# Patient Record
Sex: MALE | Race: BLACK OR AFRICAN AMERICAN | Employment: FULL TIME | URBAN - METROPOLITAN AREA
[De-identification: names, ages, dates, MRNs, and addresses within clinical notes are randomized per-mention and may not be internally consistent; named-entity substitution may affect disease eponyms.]

---

## 2018-12-23 ENCOUNTER — HOSPITAL ENCOUNTER (EMERGENCY)
Age: 53
Discharge: HOME | End: 2018-12-24
Attending: EMERGENCY MEDICINE
Payer: COMMERCIAL

## 2018-12-23 ENCOUNTER — APPOINTMENT (OUTPATIENT)
Dept: GENERAL RADIOLOGY | Age: 53
End: 2018-12-23
Payer: COMMERCIAL

## 2018-12-23 DIAGNOSIS — J02.0 STREP PHARYNGITIS: Primary | ICD-10-CM

## 2018-12-23 LAB
CHP ED QC CHECK: YES
GLUCOSE BLD-MCNC: 210 MG/DL
METER GLUCOSE: 210 MG/DL (ref 74–99)
STREP GRP A PCR: POSITIVE

## 2018-12-23 PROCEDURE — 71046 X-RAY EXAM CHEST 2 VIEWS: CPT

## 2018-12-23 PROCEDURE — 82962 GLUCOSE BLOOD TEST: CPT

## 2018-12-23 PROCEDURE — 87880 STREP A ASSAY W/OPTIC: CPT

## 2018-12-23 PROCEDURE — 99283 EMERGENCY DEPT VISIT LOW MDM: CPT

## 2018-12-23 ASSESSMENT — ENCOUNTER SYMPTOMS
ABDOMINAL DISTENTION: 0
NAUSEA: 0
RHINORRHEA: 1
SORE THROAT: 1
BACK PAIN: 0
ABDOMINAL PAIN: 0
CHEST TIGHTNESS: 0
WHEEZING: 0
COUGH: 1
SHORTNESS OF BREATH: 0
TROUBLE SWALLOWING: 0
DIARRHEA: 0
VOICE CHANGE: 0
SINUS PRESSURE: 1
VOMITING: 0

## 2018-12-23 ASSESSMENT — PAIN DESCRIPTION - LOCATION: LOCATION: BACK;SHOULDER

## 2018-12-23 ASSESSMENT — PAIN DESCRIPTION - FREQUENCY: FREQUENCY: CONTINUOUS

## 2018-12-23 ASSESSMENT — PAIN DESCRIPTION - PAIN TYPE: TYPE: ACUTE PAIN

## 2018-12-23 ASSESSMENT — PAIN DESCRIPTION - DESCRIPTORS: DESCRIPTORS: ACHING

## 2018-12-24 VITALS
RESPIRATION RATE: 16 BRPM | OXYGEN SATURATION: 93 % | TEMPERATURE: 98.6 F | HEART RATE: 112 BPM | HEIGHT: 69 IN | DIASTOLIC BLOOD PRESSURE: 114 MMHG | BODY MASS INDEX: 35.99 KG/M2 | SYSTOLIC BLOOD PRESSURE: 167 MMHG | WEIGHT: 243 LBS

## 2018-12-24 PROCEDURE — 6370000000 HC RX 637 (ALT 250 FOR IP): Performed by: EMERGENCY MEDICINE

## 2018-12-24 RX ORDER — AMOXICILLIN 250 MG/1
500 CAPSULE ORAL ONCE
Status: COMPLETED | OUTPATIENT
Start: 2018-12-24 | End: 2018-12-24

## 2018-12-24 RX ORDER — AMOXICILLIN 500 MG/1
500 CAPSULE ORAL 2 TIMES DAILY
Qty: 20 CAPSULE | Refills: 0 | Status: SHIPPED | OUTPATIENT
Start: 2018-12-24 | End: 2019-01-03

## 2018-12-24 RX ADMIN — AMOXICILLIN 500 MG: 250 CAPSULE ORAL at 00:39

## 2018-12-24 NOTE — ED PROVIDER NOTES
midline. No trismus in the jaw. No uvula swelling. Posterior oropharyngeal erythema present. No oropharyngeal exudate, posterior oropharyngeal edema or tonsillar abscesses. Nasal passage bogginess and drainage noted. Gen. frontal erythema noted with postnasal drainage. No uvular deviation, no focal tonsillar hypertrophy or swelling or exudate. No signs of abscess. No trismus or stridor. Eyes: Pupils are equal, round, and reactive to light. Neck: Normal range of motion and full passive range of motion without pain. Neck supple. No tracheal tenderness, no spinous process tenderness and no muscular tenderness present. No neck rigidity. No tracheal deviation, no edema, no erythema and normal range of motion present. Cardiovascular: Normal rate, regular rhythm and normal heart sounds. No murmur heard. Pulmonary/Chest: Effort normal and breath sounds normal. No stridor. No respiratory distress. He has no decreased breath sounds. He has no wheezes. He has no rhonchi. He has no rales. He exhibits no tenderness. Abdominal: Soft. Normal appearance and bowel sounds are normal. There is no tenderness. There is no rigidity, no rebound, no guarding and no CVA tenderness. Musculoskeletal: He exhibits no edema, tenderness or deformity. There is no pretibial edema nor calf tenderness bilaterally       Neurological: He is alert and oriented to person, place, and time. He is not disoriented. He displays no atrophy and no tremor. No cranial nerve deficit or sensory deficit. He exhibits normal muscle tone. He displays no seizure activity. Coordination and gait normal. GCS eye subscore is 4. GCS verbal subscore is 5. GCS motor subscore is 6. Skin: Skin is warm and dry. He is not diaphoretic. Nursing note and vitals reviewed.       Procedures    MDM            --------------------------------------------- PAST HISTORY ---------------------------------------------  Past Medical History:  has a past medical history of

## 2019-02-21 ENCOUNTER — APPOINTMENT (OUTPATIENT)
Dept: CT IMAGING | Age: 54
End: 2019-02-21
Payer: COMMERCIAL

## 2019-02-21 ENCOUNTER — HOSPITAL ENCOUNTER (EMERGENCY)
Age: 54
Discharge: HOME | End: 2019-02-21
Attending: EMERGENCY MEDICINE
Payer: COMMERCIAL

## 2019-02-21 VITALS
SYSTOLIC BLOOD PRESSURE: 183 MMHG | TEMPERATURE: 98.4 F | WEIGHT: 246.31 LBS | BODY MASS INDEX: 36.48 KG/M2 | HEART RATE: 82 BPM | RESPIRATION RATE: 16 BRPM | DIASTOLIC BLOOD PRESSURE: 108 MMHG | OXYGEN SATURATION: 98 % | HEIGHT: 69 IN

## 2019-02-21 DIAGNOSIS — K76.89 LIVER CYST: ICD-10-CM

## 2019-02-21 DIAGNOSIS — R10.12 ABDOMINAL PAIN, LEFT UPPER QUADRANT: Primary | ICD-10-CM

## 2019-02-21 LAB
ALBUMIN SERPL-MCNC: 4.3 G/DL (ref 3.5–5.2)
ALP BLD-CCNC: 56 U/L (ref 40–129)
ALT SERPL-CCNC: 31 U/L (ref 0–40)
ANION GAP SERPL CALCULATED.3IONS-SCNC: 13 MMOL/L (ref 7–16)
AST SERPL-CCNC: 30 U/L (ref 0–39)
BACTERIA: NORMAL /HPF
BILIRUB SERPL-MCNC: 0.3 MG/DL (ref 0–1.2)
BILIRUBIN URINE: NEGATIVE
BLOOD, URINE: ABNORMAL
BUN BLDV-MCNC: 17 MG/DL (ref 6–20)
CALCIUM SERPL-MCNC: 9 MG/DL (ref 8.6–10.2)
CHLORIDE BLD-SCNC: 100 MMOL/L (ref 98–107)
CLARITY: CLEAR
CO2: 24 MMOL/L (ref 22–29)
COLOR: YELLOW
CREAT SERPL-MCNC: 1.3 MG/DL (ref 0.7–1.2)
EPITHELIAL CELLS, UA: NORMAL /HPF
GFR AFRICAN AMERICAN: >60
GFR NON-AFRICAN AMERICAN: >60 ML/MIN/1.73
GLUCOSE BLD-MCNC: 154 MG/DL (ref 74–99)
GLUCOSE URINE: NEGATIVE MG/DL
HCT VFR BLD CALC: 46.5 % (ref 37–54)
HEMOGLOBIN: 15.7 G/DL (ref 12.5–16.5)
KETONES, URINE: NEGATIVE MG/DL
LACTIC ACID: 0.9 MMOL/L (ref 0.5–2.2)
LEUKOCYTE ESTERASE, URINE: NEGATIVE
LIPASE: 21 U/L (ref 13–60)
MCH RBC QN AUTO: 31.2 PG (ref 26–35)
MCHC RBC AUTO-ENTMCNC: 33.8 % (ref 32–34.5)
MCV RBC AUTO: 92.4 FL (ref 80–99.9)
NITRITE, URINE: NEGATIVE
PDW BLD-RTO: 12.2 FL (ref 11.5–15)
PH UA: 5.5 (ref 5–9)
PLATELET # BLD: 297 E9/L (ref 130–450)
PMV BLD AUTO: 11.3 FL (ref 7–12)
POTASSIUM SERPL-SCNC: 4.4 MMOL/L (ref 3.5–5)
PROTEIN UA: 30 MG/DL
RBC # BLD: 5.03 E12/L (ref 3.8–5.8)
RBC UA: NORMAL /HPF (ref 0–2)
SODIUM BLD-SCNC: 137 MMOL/L (ref 132–146)
SPECIFIC GRAVITY UA: 1.02 (ref 1–1.03)
TOTAL PROTEIN: 8.5 G/DL (ref 6.4–8.3)
UROBILINOGEN, URINE: 0.2 E.U./DL
WBC # BLD: 13.3 E9/L (ref 4.5–11.5)
WBC UA: NORMAL /HPF (ref 0–5)

## 2019-02-21 PROCEDURE — 99284 EMERGENCY DEPT VISIT MOD MDM: CPT

## 2019-02-21 PROCEDURE — 83605 ASSAY OF LACTIC ACID: CPT

## 2019-02-21 PROCEDURE — 6360000002 HC RX W HCPCS: Performed by: EMERGENCY MEDICINE

## 2019-02-21 PROCEDURE — 74177 CT ABD & PELVIS W/CONTRAST: CPT

## 2019-02-21 PROCEDURE — 6360000004 HC RX CONTRAST MEDICATION: Performed by: RADIOLOGY

## 2019-02-21 PROCEDURE — 81001 URINALYSIS AUTO W/SCOPE: CPT

## 2019-02-21 PROCEDURE — 80053 COMPREHEN METABOLIC PANEL: CPT

## 2019-02-21 PROCEDURE — 96374 THER/PROPH/DIAG INJ IV PUSH: CPT

## 2019-02-21 PROCEDURE — 85027 COMPLETE CBC AUTOMATED: CPT

## 2019-02-21 PROCEDURE — 6370000000 HC RX 637 (ALT 250 FOR IP): Performed by: EMERGENCY MEDICINE

## 2019-02-21 PROCEDURE — 83690 ASSAY OF LIPASE: CPT

## 2019-02-21 PROCEDURE — 36415 COLL VENOUS BLD VENIPUNCTURE: CPT

## 2019-02-21 PROCEDURE — 87088 URINE BACTERIA CULTURE: CPT

## 2019-02-21 RX ORDER — HYDROCODONE BITARTRATE AND ACETAMINOPHEN 5; 325 MG/1; MG/1
1 TABLET ORAL ONCE
Status: DISCONTINUED | OUTPATIENT
Start: 2019-02-21 | End: 2019-02-21

## 2019-02-21 RX ORDER — OXYCODONE HYDROCHLORIDE AND ACETAMINOPHEN 5; 325 MG/1; MG/1
2 TABLET ORAL ONCE
Status: COMPLETED | OUTPATIENT
Start: 2019-02-21 | End: 2019-02-21

## 2019-02-21 RX ORDER — MORPHINE SULFATE 10 MG/ML
5 INJECTION, SOLUTION INTRAMUSCULAR; INTRAVENOUS ONCE
Status: COMPLETED | OUTPATIENT
Start: 2019-02-21 | End: 2019-02-21

## 2019-02-21 RX ADMIN — OXYCODONE AND ACETAMINOPHEN 2 TABLET: 5; 325 TABLET ORAL at 03:42

## 2019-02-21 RX ADMIN — MORPHINE SULFATE 5 MG: 10 INJECTION INTRAVENOUS at 01:00

## 2019-02-21 RX ADMIN — IOPAMIDOL 80 ML: 755 INJECTION, SOLUTION INTRAVENOUS at 02:18

## 2019-02-21 ASSESSMENT — PAIN SCALES - GENERAL
PAINLEVEL_OUTOF10: 9

## 2019-02-21 ASSESSMENT — PAIN DESCRIPTION - PAIN TYPE: TYPE: ACUTE PAIN

## 2019-02-21 ASSESSMENT — ENCOUNTER SYMPTOMS
CONSTIPATION: 1
NAUSEA: 0
ABDOMINAL PAIN: 1
VOMITING: 0
DIARRHEA: 0

## 2019-02-23 LAB — URINE CULTURE, ROUTINE: NORMAL

## 2021-01-23 ENCOUNTER — HOSPITAL ENCOUNTER (INPATIENT)
Age: 56
LOS: 3 days | Discharge: HOME | DRG: 580 | End: 2021-01-26
Attending: EMERGENCY MEDICINE | Admitting: INTERNAL MEDICINE
Payer: COMMERCIAL

## 2021-01-23 ENCOUNTER — APPOINTMENT (OUTPATIENT)
Dept: CT IMAGING | Age: 56
DRG: 580 | End: 2021-01-23
Payer: COMMERCIAL

## 2021-01-23 DIAGNOSIS — L03.311 CELLULITIS OF ABDOMINAL WALL: Primary | ICD-10-CM

## 2021-01-23 DIAGNOSIS — L03.314 CELLULITIS OF GROIN: ICD-10-CM

## 2021-01-23 PROBLEM — L03.90 CELLULITIS: Status: ACTIVE | Noted: 2021-01-23

## 2021-01-23 LAB
ANION GAP SERPL CALCULATED.3IONS-SCNC: 8 MMOL/L (ref 7–16)
BASOPHILS ABSOLUTE: 0.04 E9/L (ref 0–0.2)
BASOPHILS RELATIVE PERCENT: 0.4 % (ref 0–2)
BUN BLDV-MCNC: 9 MG/DL (ref 6–20)
CALCIUM SERPL-MCNC: 9 MG/DL (ref 8.6–10.2)
CHLORIDE BLD-SCNC: 100 MMOL/L (ref 98–107)
CO2: 27 MMOL/L (ref 22–29)
CREAT SERPL-MCNC: 1.1 MG/DL (ref 0.7–1.2)
EOSINOPHILS ABSOLUTE: 0.2 E9/L (ref 0.05–0.5)
EOSINOPHILS RELATIVE PERCENT: 1.8 % (ref 0–6)
GFR AFRICAN AMERICAN: >60
GFR NON-AFRICAN AMERICAN: >60 ML/MIN/1.73
GLUCOSE BLD-MCNC: 219 MG/DL (ref 74–99)
HCT VFR BLD CALC: 41.1 % (ref 37–54)
HEMOGLOBIN: 13.8 G/DL (ref 12.5–16.5)
IMMATURE GRANULOCYTES #: 0.04 E9/L
IMMATURE GRANULOCYTES %: 0.4 % (ref 0–5)
LACTIC ACID: 1.1 MMOL/L (ref 0.5–2.2)
LYMPHOCYTES ABSOLUTE: 3.2 E9/L (ref 1.5–4)
LYMPHOCYTES RELATIVE PERCENT: 28.3 % (ref 20–42)
MCH RBC QN AUTO: 31.7 PG (ref 26–35)
MCHC RBC AUTO-ENTMCNC: 33.6 % (ref 32–34.5)
MCV RBC AUTO: 94.5 FL (ref 80–99.9)
MONOCYTES ABSOLUTE: 0.79 E9/L (ref 0.1–0.95)
MONOCYTES RELATIVE PERCENT: 7 % (ref 2–12)
NEUTROPHILS ABSOLUTE: 7.04 E9/L (ref 1.8–7.3)
NEUTROPHILS RELATIVE PERCENT: 62.1 % (ref 43–80)
PDW BLD-RTO: 13.1 FL (ref 11.5–15)
PLATELET # BLD: 234 E9/L (ref 130–450)
PMV BLD AUTO: 11.1 FL (ref 7–12)
POTASSIUM REFLEX MAGNESIUM: 4 MMOL/L (ref 3.5–5)
RBC # BLD: 4.35 E12/L (ref 3.8–5.8)
SODIUM BLD-SCNC: 135 MMOL/L (ref 132–146)
WBC # BLD: 11.3 E9/L (ref 4.5–11.5)

## 2021-01-23 PROCEDURE — 6360000004 HC RX CONTRAST MEDICATION: Performed by: RADIOLOGY

## 2021-01-23 PROCEDURE — 96375 TX/PRO/DX INJ NEW DRUG ADDON: CPT

## 2021-01-23 PROCEDURE — 99284 EMERGENCY DEPT VISIT MOD MDM: CPT

## 2021-01-23 PROCEDURE — 2580000003 HC RX 258: Performed by: PHYSICIAN ASSISTANT

## 2021-01-23 PROCEDURE — 6360000002 HC RX W HCPCS: Performed by: PHYSICIAN ASSISTANT

## 2021-01-23 PROCEDURE — 96361 HYDRATE IV INFUSION ADD-ON: CPT

## 2021-01-23 PROCEDURE — 96374 THER/PROPH/DIAG INJ IV PUSH: CPT

## 2021-01-23 PROCEDURE — 87040 BLOOD CULTURE FOR BACTERIA: CPT

## 2021-01-23 PROCEDURE — 36415 COLL VENOUS BLD VENIPUNCTURE: CPT

## 2021-01-23 PROCEDURE — 1200000000 HC SEMI PRIVATE

## 2021-01-23 PROCEDURE — 85025 COMPLETE CBC W/AUTO DIFF WBC: CPT

## 2021-01-23 PROCEDURE — 74177 CT ABD & PELVIS W/CONTRAST: CPT

## 2021-01-23 PROCEDURE — 80048 BASIC METABOLIC PNL TOTAL CA: CPT

## 2021-01-23 PROCEDURE — 83605 ASSAY OF LACTIC ACID: CPT

## 2021-01-23 RX ORDER — MORPHINE SULFATE 4 MG/ML
4 INJECTION, SOLUTION INTRAMUSCULAR; INTRAVENOUS ONCE
Status: COMPLETED | OUTPATIENT
Start: 2021-01-23 | End: 2021-01-23

## 2021-01-23 RX ORDER — FENTANYL CITRATE 50 UG/ML
25 INJECTION, SOLUTION INTRAMUSCULAR; INTRAVENOUS ONCE
Status: COMPLETED | OUTPATIENT
Start: 2021-01-23 | End: 2021-01-23

## 2021-01-23 RX ORDER — 0.9 % SODIUM CHLORIDE 0.9 %
500 INTRAVENOUS SOLUTION INTRAVENOUS ONCE
Status: COMPLETED | OUTPATIENT
Start: 2021-01-23 | End: 2021-01-23

## 2021-01-23 RX ORDER — ONDANSETRON 2 MG/ML
4 INJECTION INTRAMUSCULAR; INTRAVENOUS ONCE
Status: COMPLETED | OUTPATIENT
Start: 2021-01-23 | End: 2021-01-23

## 2021-01-23 RX ADMIN — IOPAMIDOL 75 ML: 755 INJECTION, SOLUTION INTRAVENOUS at 21:03

## 2021-01-23 RX ADMIN — PIPERACILLIN AND TAZOBACTAM 3375 MG: 3; .375 INJECTION, POWDER, FOR SOLUTION INTRAVENOUS at 22:40

## 2021-01-23 RX ADMIN — MORPHINE SULFATE 4 MG: 4 INJECTION, SOLUTION INTRAMUSCULAR; INTRAVENOUS at 22:05

## 2021-01-23 RX ADMIN — ONDANSETRON 4 MG: 2 INJECTION INTRAMUSCULAR; INTRAVENOUS at 20:21

## 2021-01-23 RX ADMIN — SODIUM CHLORIDE 500 ML: 9 INJECTION, SOLUTION INTRAVENOUS at 20:19

## 2021-01-23 RX ADMIN — FENTANYL CITRATE 25 MCG: 50 INJECTION INTRAMUSCULAR; INTRAVENOUS at 20:20

## 2021-01-23 ASSESSMENT — PAIN SCALES - GENERAL: PAINLEVEL_OUTOF10: 10

## 2021-01-23 ASSESSMENT — PAIN DESCRIPTION - DESCRIPTORS: DESCRIPTORS: TENDER

## 2021-01-24 LAB
ABO/RH: NORMAL
ALBUMIN SERPL-MCNC: 3.9 G/DL (ref 3.5–5.2)
ALP BLD-CCNC: 80 U/L (ref 40–129)
ALT SERPL-CCNC: 31 U/L (ref 0–40)
ANION GAP SERPL CALCULATED.3IONS-SCNC: 10 MMOL/L (ref 7–16)
ANTIBODY SCREEN: NORMAL
ANTISTREPTOLYSIN-O: 155 IU/ML (ref 0–200)
AST SERPL-CCNC: 17 U/L (ref 0–39)
BACTERIA: ABNORMAL /HPF
BASOPHILS ABSOLUTE: 0.04 E9/L (ref 0–0.2)
BASOPHILS RELATIVE PERCENT: 0.3 % (ref 0–2)
BILIRUB SERPL-MCNC: 0.4 MG/DL (ref 0–1.2)
BILIRUBIN URINE: NEGATIVE
BLOOD, URINE: NEGATIVE
BUN BLDV-MCNC: 8 MG/DL (ref 6–20)
C-REACTIVE PROTEIN: 2.4 MG/DL (ref 0–0.4)
CALCIUM SERPL-MCNC: 8.7 MG/DL (ref 8.6–10.2)
CHLORIDE BLD-SCNC: 102 MMOL/L (ref 98–107)
CHOLESTEROL, TOTAL: 258 MG/DL (ref 0–199)
CLARITY: CLEAR
CO2: 24 MMOL/L (ref 22–29)
COLOR: YELLOW
CREAT SERPL-MCNC: 1 MG/DL (ref 0.7–1.2)
EOSINOPHILS ABSOLUTE: 0.25 E9/L (ref 0.05–0.5)
EOSINOPHILS RELATIVE PERCENT: 2.1 % (ref 0–6)
GFR AFRICAN AMERICAN: >60
GFR NON-AFRICAN AMERICAN: >60 ML/MIN/1.73
GLUCOSE BLD-MCNC: 204 MG/DL (ref 74–99)
GLUCOSE URINE: 250 MG/DL
HBA1C MFR BLD: 10 % (ref 4–5.6)
HCT VFR BLD CALC: 41.5 % (ref 37–54)
HCT VFR BLD CALC: 42.3 % (ref 37–54)
HDLC SERPL-MCNC: 45 MG/DL
HEMOGLOBIN: 13.6 G/DL (ref 12.5–16.5)
HEMOGLOBIN: 13.7 G/DL (ref 12.5–16.5)
IMMATURE GRANULOCYTES #: 0.05 E9/L
IMMATURE GRANULOCYTES %: 0.4 % (ref 0–5)
INR BLD: 1
KETONES, URINE: NEGATIVE MG/DL
LDL CHOLESTEROL CALCULATED: 183 MG/DL (ref 0–99)
LEUKOCYTE ESTERASE, URINE: NEGATIVE
LYMPHOCYTES ABSOLUTE: 2.9 E9/L (ref 1.5–4)
LYMPHOCYTES RELATIVE PERCENT: 24.5 % (ref 20–42)
MAGNESIUM: 2 MG/DL (ref 1.6–2.6)
MCH RBC QN AUTO: 30.8 PG (ref 26–35)
MCH RBC QN AUTO: 30.9 PG (ref 26–35)
MCHC RBC AUTO-ENTMCNC: 32.4 % (ref 32–34.5)
MCHC RBC AUTO-ENTMCNC: 32.8 % (ref 32–34.5)
MCV RBC AUTO: 94.1 FL (ref 80–99.9)
MCV RBC AUTO: 95.3 FL (ref 80–99.9)
METER GLUCOSE: 179 MG/DL (ref 74–99)
METER GLUCOSE: 195 MG/DL (ref 74–99)
METER GLUCOSE: 263 MG/DL (ref 74–99)
MONOCYTES ABSOLUTE: 0.86 E9/L (ref 0.1–0.95)
MONOCYTES RELATIVE PERCENT: 7.3 % (ref 2–12)
NEUTROPHILS ABSOLUTE: 7.74 E9/L (ref 1.8–7.3)
NEUTROPHILS RELATIVE PERCENT: 65.4 % (ref 43–80)
NITRITE, URINE: NEGATIVE
PDW BLD-RTO: 12.9 FL (ref 11.5–15)
PDW BLD-RTO: 13.2 FL (ref 11.5–15)
PH UA: 5.5 (ref 5–9)
PHOSPHORUS: 2.9 MG/DL (ref 2.5–4.5)
PLATELET # BLD: 232 E9/L (ref 130–450)
PLATELET # BLD: 264 E9/L (ref 130–450)
PMV BLD AUTO: 11.4 FL (ref 7–12)
PMV BLD AUTO: 11.4 FL (ref 7–12)
POTASSIUM SERPL-SCNC: 3.7 MMOL/L (ref 3.5–5)
PROTEIN UA: NEGATIVE MG/DL
PROTHROMBIN TIME: 11.8 SEC (ref 9.3–12.4)
RBC # BLD: 4.41 E12/L (ref 3.8–5.8)
RBC # BLD: 4.44 E12/L (ref 3.8–5.8)
RBC UA: ABNORMAL /HPF (ref 0–2)
SEDIMENTATION RATE, ERYTHROCYTE: 30 MM/HR (ref 0–15)
SODIUM BLD-SCNC: 136 MMOL/L (ref 132–146)
SPECIFIC GRAVITY UA: 1.02 (ref 1–1.03)
TOTAL PROTEIN: 7.6 G/DL (ref 6.4–8.3)
TRIGL SERPL-MCNC: 149 MG/DL (ref 0–149)
UROBILINOGEN, URINE: 0.2 E.U./DL
VLDLC SERPL CALC-MCNC: 30 MG/DL
WBC # BLD: 11.8 E9/L (ref 4.5–11.5)
WBC # BLD: 16.2 E9/L (ref 4.5–11.5)
WBC UA: ABNORMAL /HPF (ref 0–5)

## 2021-01-24 PROCEDURE — 6360000002 HC RX W HCPCS: Performed by: INTERNAL MEDICINE

## 2021-01-24 PROCEDURE — 80061 LIPID PANEL: CPT

## 2021-01-24 PROCEDURE — 87070 CULTURE OTHR SPECIMN AEROBIC: CPT

## 2021-01-24 PROCEDURE — 0J980ZZ DRAINAGE OF ABDOMEN SUBCUTANEOUS TISSUE AND FASCIA, OPEN APPROACH: ICD-10-PCS | Performed by: SURGERY

## 2021-01-24 PROCEDURE — 1200000000 HC SEMI PRIVATE

## 2021-01-24 PROCEDURE — 6370000000 HC RX 637 (ALT 250 FOR IP): Performed by: INTERNAL MEDICINE

## 2021-01-24 PROCEDURE — 85610 PROTHROMBIN TIME: CPT

## 2021-01-24 PROCEDURE — 87186 SC STD MICRODIL/AGAR DIL: CPT

## 2021-01-24 PROCEDURE — 86140 C-REACTIVE PROTEIN: CPT

## 2021-01-24 PROCEDURE — 2500000003 HC RX 250 WO HCPCS: Performed by: INTERNAL MEDICINE

## 2021-01-24 PROCEDURE — 36415 COLL VENOUS BLD VENIPUNCTURE: CPT

## 2021-01-24 PROCEDURE — 85651 RBC SED RATE NONAUTOMATED: CPT

## 2021-01-24 PROCEDURE — 84100 ASSAY OF PHOSPHORUS: CPT

## 2021-01-24 PROCEDURE — 94664 DEMO&/EVAL PT USE INHALER: CPT

## 2021-01-24 PROCEDURE — 86900 BLOOD TYPING SEROLOGIC ABO: CPT

## 2021-01-24 PROCEDURE — 82962 GLUCOSE BLOOD TEST: CPT

## 2021-01-24 PROCEDURE — 86850 RBC ANTIBODY SCREEN: CPT

## 2021-01-24 PROCEDURE — 83735 ASSAY OF MAGNESIUM: CPT

## 2021-01-24 PROCEDURE — 94640 AIRWAY INHALATION TREATMENT: CPT

## 2021-01-24 PROCEDURE — 83036 HEMOGLOBIN GLYCOSYLATED A1C: CPT

## 2021-01-24 PROCEDURE — 86060 ANTISTREPTOLYSIN O TITER: CPT

## 2021-01-24 PROCEDURE — 80053 COMPREHEN METABOLIC PANEL: CPT

## 2021-01-24 PROCEDURE — 86901 BLOOD TYPING SEROLOGIC RH(D): CPT

## 2021-01-24 PROCEDURE — 99254 IP/OBS CNSLTJ NEW/EST MOD 60: CPT | Performed by: SURGERY

## 2021-01-24 PROCEDURE — 2580000003 HC RX 258: Performed by: INTERNAL MEDICINE

## 2021-01-24 PROCEDURE — 85025 COMPLETE CBC W/AUTO DIFF WBC: CPT

## 2021-01-24 PROCEDURE — 85027 COMPLETE CBC AUTOMATED: CPT

## 2021-01-24 PROCEDURE — 81001 URINALYSIS AUTO W/SCOPE: CPT

## 2021-01-24 RX ORDER — OXYCODONE AND ACETAMINOPHEN 10; 325 MG/1; MG/1
1 TABLET ORAL EVERY 6 HOURS PRN
Status: DISCONTINUED | OUTPATIENT
Start: 2021-01-24 | End: 2021-01-26 | Stop reason: HOSPADM

## 2021-01-24 RX ORDER — OXYCODONE HYDROCHLORIDE AND ACETAMINOPHEN 5; 325 MG/1; MG/1
2 TABLET ORAL EVERY 6 HOURS PRN
Status: DISCONTINUED | OUTPATIENT
Start: 2021-01-24 | End: 2021-01-24

## 2021-01-24 RX ORDER — SODIUM CHLORIDE 9 MG/ML
INJECTION, SOLUTION INTRAVENOUS CONTINUOUS
Status: ACTIVE | OUTPATIENT
Start: 2021-01-24 | End: 2021-01-25

## 2021-01-24 RX ORDER — ALOGLIPTIN 25 MG/1
25 TABLET, FILM COATED ORAL DAILY
Status: DISCONTINUED | OUTPATIENT
Start: 2021-01-24 | End: 2021-01-26 | Stop reason: HOSPADM

## 2021-01-24 RX ORDER — ACETAMINOPHEN 650 MG/1
650 SUPPOSITORY RECTAL EVERY 6 HOURS PRN
Status: DISCONTINUED | OUTPATIENT
Start: 2021-01-24 | End: 2021-01-26 | Stop reason: HOSPADM

## 2021-01-24 RX ORDER — LISINOPRIL 20 MG/1
20 TABLET ORAL DAILY
Status: DISCONTINUED | OUTPATIENT
Start: 2021-01-24 | End: 2021-01-25

## 2021-01-24 RX ORDER — ALOGLIPTIN 6.25 MG/1
6.25 TABLET, FILM COATED ORAL DAILY
Status: DISCONTINUED | OUTPATIENT
Start: 2021-01-24 | End: 2021-01-24

## 2021-01-24 RX ORDER — DILTIAZEM HYDROCHLORIDE 120 MG/1
240 CAPSULE, COATED, EXTENDED RELEASE ORAL DAILY
Status: DISCONTINUED | OUTPATIENT
Start: 2021-01-24 | End: 2021-01-25

## 2021-01-24 RX ORDER — ATORVASTATIN CALCIUM 20 MG/1
20 TABLET, FILM COATED ORAL NIGHTLY
Status: DISCONTINUED | OUTPATIENT
Start: 2021-01-24 | End: 2021-01-26 | Stop reason: HOSPADM

## 2021-01-24 RX ORDER — HYDROCHLOROTHIAZIDE 12.5 MG/1
12.5 TABLET ORAL EVERY MORNING
Status: DISCONTINUED | OUTPATIENT
Start: 2021-01-24 | End: 2021-01-25

## 2021-01-24 RX ORDER — POLYETHYLENE GLYCOL 3350 17 G/17G
17 POWDER, FOR SOLUTION ORAL DAILY PRN
Status: DISCONTINUED | OUTPATIENT
Start: 2021-01-24 | End: 2021-01-26 | Stop reason: HOSPADM

## 2021-01-24 RX ORDER — BUDESONIDE AND FORMOTEROL FUMARATE DIHYDRATE 160; 4.5 UG/1; UG/1
2 AEROSOL RESPIRATORY (INHALATION) 2 TIMES DAILY
Status: DISCONTINUED | OUTPATIENT
Start: 2021-01-24 | End: 2021-01-24 | Stop reason: CLARIF

## 2021-01-24 RX ORDER — OXYCODONE HYDROCHLORIDE AND ACETAMINOPHEN 5; 325 MG/1; MG/1
1 TABLET ORAL EVERY 4 HOURS PRN
Status: DISCONTINUED | OUTPATIENT
Start: 2021-01-24 | End: 2021-01-24

## 2021-01-24 RX ORDER — ARFORMOTEROL TARTRATE 15 UG/2ML
15 SOLUTION RESPIRATORY (INHALATION) 2 TIMES DAILY
Status: DISCONTINUED | OUTPATIENT
Start: 2021-01-24 | End: 2021-01-26 | Stop reason: HOSPADM

## 2021-01-24 RX ORDER — ACETAMINOPHEN 325 MG/1
650 TABLET ORAL EVERY 6 HOURS PRN
Status: DISCONTINUED | OUTPATIENT
Start: 2021-01-24 | End: 2021-01-26 | Stop reason: HOSPADM

## 2021-01-24 RX ORDER — DEXTROSE MONOHYDRATE 25 G/50ML
12.5 INJECTION, SOLUTION INTRAVENOUS PRN
Status: DISCONTINUED | OUTPATIENT
Start: 2021-01-24 | End: 2021-01-26 | Stop reason: HOSPADM

## 2021-01-24 RX ORDER — SODIUM CHLORIDE 0.9 % (FLUSH) 0.9 %
10 SYRINGE (ML) INJECTION EVERY 12 HOURS SCHEDULED
Status: DISCONTINUED | OUTPATIENT
Start: 2021-01-24 | End: 2021-01-26 | Stop reason: HOSPADM

## 2021-01-24 RX ORDER — DEXTROSE MONOHYDRATE 50 MG/ML
100 INJECTION, SOLUTION INTRAVENOUS PRN
Status: DISCONTINUED | OUTPATIENT
Start: 2021-01-24 | End: 2021-01-26 | Stop reason: HOSPADM

## 2021-01-24 RX ORDER — SODIUM CHLORIDE 9 MG/ML
25 INJECTION, SOLUTION INTRAVENOUS EVERY 8 HOURS
Status: DISCONTINUED | OUTPATIENT
Start: 2021-01-24 | End: 2021-01-24 | Stop reason: CLARIF

## 2021-01-24 RX ORDER — SODIUM CHLORIDE 9 MG/ML
25 INJECTION, SOLUTION INTRAVENOUS EVERY 12 HOURS
Status: DISCONTINUED | OUTPATIENT
Start: 2021-01-24 | End: 2021-01-26 | Stop reason: HOSPADM

## 2021-01-24 RX ORDER — SODIUM CHLORIDE 0.9 % (FLUSH) 0.9 %
10 SYRINGE (ML) INJECTION PRN
Status: DISCONTINUED | OUTPATIENT
Start: 2021-01-24 | End: 2021-01-26 | Stop reason: HOSPADM

## 2021-01-24 RX ORDER — OXYCODONE HYDROCHLORIDE AND ACETAMINOPHEN 5; 325 MG/1; MG/1
1 TABLET ORAL EVERY 4 HOURS PRN
Status: DISCONTINUED | OUTPATIENT
Start: 2021-01-24 | End: 2021-01-26 | Stop reason: HOSPADM

## 2021-01-24 RX ORDER — ASPIRIN 81 MG/1
81 TABLET ORAL DAILY
Status: DISCONTINUED | OUTPATIENT
Start: 2021-01-24 | End: 2021-01-26 | Stop reason: HOSPADM

## 2021-01-24 RX ORDER — BUDESONIDE 0.5 MG/2ML
0.5 INHALANT ORAL 2 TIMES DAILY
Status: DISCONTINUED | OUTPATIENT
Start: 2021-01-24 | End: 2021-01-26 | Stop reason: HOSPADM

## 2021-01-24 RX ORDER — NICOTINE POLACRILEX 4 MG
15 LOZENGE BUCCAL PRN
Status: DISCONTINUED | OUTPATIENT
Start: 2021-01-24 | End: 2021-01-26 | Stop reason: HOSPADM

## 2021-01-24 RX ADMIN — ASPIRIN 81 MG: 81 TABLET, COATED ORAL at 08:04

## 2021-01-24 RX ADMIN — DILTIAZEM HYDROCHLORIDE 240 MG: 120 CAPSULE, COATED, EXTENDED RELEASE ORAL at 08:04

## 2021-01-24 RX ADMIN — ATORVASTATIN CALCIUM 20 MG: 20 TABLET, FILM COATED ORAL at 20:13

## 2021-01-24 RX ADMIN — OXYCODONE HYDROCHLORIDE AND ACETAMINOPHEN 1 TABLET: 10; 325 TABLET ORAL at 14:11

## 2021-01-24 RX ADMIN — SODIUM CHLORIDE, PRESERVATIVE FREE 10 ML: 5 INJECTION INTRAVENOUS at 01:14

## 2021-01-24 RX ADMIN — SODIUM CHLORIDE 25 ML: 9 INJECTION, SOLUTION INTRAVENOUS at 11:12

## 2021-01-24 RX ADMIN — ARFORMOTEROL TARTRATE 15 MCG: 15 SOLUTION RESPIRATORY (INHALATION) at 06:18

## 2021-01-24 RX ADMIN — ALOGLIPTIN 25 MG: 25 TABLET, FILM COATED ORAL at 14:09

## 2021-01-24 RX ADMIN — INSULIN LISPRO 2 UNITS: 100 INJECTION, SOLUTION INTRAVENOUS; SUBCUTANEOUS at 07:55

## 2021-01-24 RX ADMIN — BUDESONIDE 500 MCG: 0.5 INHALANT RESPIRATORY (INHALATION) at 06:18

## 2021-01-24 RX ADMIN — CEFEPIME HYDROCHLORIDE 2000 MG: 2 INJECTION, POWDER, FOR SOLUTION INTRAVENOUS at 18:47

## 2021-01-24 RX ADMIN — METRONIDAZOLE 500 MG: 500 INJECTION, SOLUTION INTRAVENOUS at 13:10

## 2021-01-24 RX ADMIN — LISINOPRIL 20 MG: 20 TABLET ORAL at 08:08

## 2021-01-24 RX ADMIN — HYDROCHLOROTHIAZIDE 12.5 MG: 12.5 TABLET ORAL at 08:04

## 2021-01-24 RX ADMIN — OXYCODONE AND ACETAMINOPHEN 1 TABLET: 5; 325 TABLET ORAL at 18:38

## 2021-01-24 RX ADMIN — METRONIDAZOLE 500 MG: 500 INJECTION, SOLUTION INTRAVENOUS at 05:31

## 2021-01-24 RX ADMIN — VANCOMYCIN HYDROCHLORIDE 1500 MG: 10 INJECTION, POWDER, LYOPHILIZED, FOR SOLUTION INTRAVENOUS at 14:08

## 2021-01-24 RX ADMIN — INSULIN LISPRO 1 UNITS: 100 INJECTION, SOLUTION INTRAVENOUS; SUBCUTANEOUS at 16:34

## 2021-01-24 RX ADMIN — CEFEPIME HYDROCHLORIDE 2000 MG: 2 INJECTION, POWDER, FOR SOLUTION INTRAVENOUS at 07:40

## 2021-01-24 RX ADMIN — SODIUM CHLORIDE: 9 INJECTION, SOLUTION INTRAVENOUS at 01:15

## 2021-01-24 RX ADMIN — METRONIDAZOLE 500 MG: 500 INJECTION, SOLUTION INTRAVENOUS at 20:13

## 2021-01-24 RX ADMIN — ENOXAPARIN SODIUM 40 MG: 40 INJECTION SUBCUTANEOUS at 08:03

## 2021-01-24 RX ADMIN — OXYCODONE HYDROCHLORIDE AND ACETAMINOPHEN 1 TABLET: 10; 325 TABLET ORAL at 08:04

## 2021-01-24 RX ADMIN — ARFORMOTEROL TARTRATE 15 MCG: 15 SOLUTION RESPIRATORY (INHALATION) at 19:00

## 2021-01-24 RX ADMIN — OXYCODONE HYDROCHLORIDE AND ACETAMINOPHEN 1 TABLET: 10; 325 TABLET ORAL at 01:14

## 2021-01-24 RX ADMIN — INSULIN LISPRO 1 UNITS: 100 INJECTION, SOLUTION INTRAVENOUS; SUBCUTANEOUS at 12:14

## 2021-01-24 RX ADMIN — VANCOMYCIN HYDROCHLORIDE 1750 MG: 10 INJECTION, POWDER, LYOPHILIZED, FOR SOLUTION INTRAVENOUS at 01:14

## 2021-01-24 RX ADMIN — INSULIN LISPRO 2 UNITS: 100 INJECTION, SOLUTION INTRAVENOUS; SUBCUTANEOUS at 20:13

## 2021-01-24 RX ADMIN — BUDESONIDE 500 MCG: 0.5 INHALANT RESPIRATORY (INHALATION) at 19:00

## 2021-01-24 ASSESSMENT — PAIN DESCRIPTION - DESCRIPTORS: DESCRIPTORS: ACHING

## 2021-01-24 ASSESSMENT — PAIN SCALES - GENERAL
PAINLEVEL_OUTOF10: 8
PAINLEVEL_OUTOF10: 5

## 2021-01-24 ASSESSMENT — PAIN DESCRIPTION - PROGRESSION
CLINICAL_PROGRESSION: GRADUALLY IMPROVING
CLINICAL_PROGRESSION: GRADUALLY IMPROVING

## 2021-01-24 ASSESSMENT — PAIN DESCRIPTION - LOCATION
LOCATION: ABDOMEN
LOCATION: ABDOMEN

## 2021-01-24 NOTE — ED NOTES
Assumed care of patient at this time. Pt vitals updated. Pt questions all answered at this time.      Simi Michael RN  01/23/21 1374

## 2021-01-24 NOTE — PROCEDURES
Incision and Drainage Procedure Note    Indication: Abscess abdominal wall    Procedure: The patient was positioned appropriately and the skin over the incision site was prepped with betadine and draped in a sterile fashion. Local anesthesia was obtained by infiltration using 1% Lidocaine without epinephrine. An incision was then made over the center of the lesion and approximately 3 cc of purulent and bloody material was expressed. Loculations were broken up using forceps but no more material was returned. The abscess cavity was superficial and did not extend to the deep space. The drainage cavity was then irrigated, packed with sterile gauze and dressed with a sterile dressing. The patient tolerated the procedure well.     Complications: None    Valentin Burnett MD, MS  Minimally Invasive and Bariatric Surgery  [phone] (p)  1/24/2021  2:15 PM

## 2021-01-24 NOTE — PROGRESS NOTES
Pharmacy Consultation Note  (Antibiotic Dosing and Monitoring)    Initial consult date: 2021  Consulting physician/provider: Dr Meli Melo  Drug(s): Vancomycin  Indication: RLQ/suprapubic cellulitis    Ht Readings from Last 1 Encounters:   21 5' 9\" (1.753 m)     Wt Readings from Last 1 Encounters:   21 247 lb (112 kg)       Age/Gender Actual BW IBW Adj BW  Allergy Information   48 y.o. male 112 kg 70.7 kg 87.2 kg  Patient has no known allergies. Date  WBC BUN/CR Drug/Dose Time   Given Level(s)   (Time) Comments   2021  Day #1 11.8 8/ Vancomycin 1750 mg IV x1 dose    Vancomycin 1500 mg IV q12h 0114        <1300> Sed Rate 30                                 Estimated Creatinine Clearance: 104 mL/min (based on SCr of 1 mg/dL). Intake/Output Summary (Last 24 hours) at 2021 1059  Last data filed at 2021 0810  Gross per 24 hour   Intake 240 ml   Output 225 ml   Net 15 ml     Urine output over the last 24 hours: incomplete documentation    Temp max: Temp (24hrs), Av.3 °F (36.8 °C), Min:98 °F (36.7 °C), Max:98.9 °F (37.2 °C)      Cultures:  available culture and sensitivity results were reviewed in [facility]   Blood cx's - Pending   Wound cx (R groin) - Pending    Assessment:  · 47 yo M admitted  with RLQ and suprapubic cellulitis  · Empiric antibiotics initiated - pt currently on Cefepime, Metronidazole and Vancomycin day #1  · Consulted by Dr. Mirta Melo to dose/monitor Vancomycin. · Goal trough level:  15-20 mCg/mL. · SCr 1 today    Plan:  · Pt received Vancomycin 1750 mg IV x1 dose this morning. Will order Vancomycin 1500 mg IV q12h  · Will order a Vancomycin trough level once pt reaches steady state and will adjust dose as needed  · Will monitor renal function closely    Will continue to follow. Thank you for the consult.     Enedina Davison, PharmD, BCPS, BCCCP  2021  11:08 AM  Pager: [phone]

## 2021-01-24 NOTE — ED PROVIDER NOTES
ED Attending  CC: No  HPI:  1/23/21, Time: 7:15 PM FRANCE Deluna is a 48 y.o. male presenting to the ED for groin pain possible abscess , beginning 5 Days  ago. The complaint has been persistent, moderate in severity, and worsened by touching the area . Patient comes in with complaint of pain swelling to the right groin area that started 5 days ago. Pain is progressively worse. Denies any fever chills. Patient does have history of diabetes. Review of Systems:   A complete review of systems was performed and pertinent positives and negatives are stated within HPI, all other systems reviewed and are negative.          --------------------------------------------- PAST HISTORY ---------------------------------------------  Past Medical History:  has a past medical history of Asthma, Diabetes mellitus ([facility] [address] H/O cardiovascular stress test, Hypertension, Insomnia, and Shoulder pain. Past Surgical History:  has no past surgical history on file. Social History:  reports that he has never smoked. He has never used smokeless tobacco. He reports that he does not drink alcohol or use drugs. Family History: family history includes Diabetes in his mother. The patients home medications have been reviewed. Allergies: Patient has no known allergies.     -------------------------------------------------- RESULTS -------------------------------------------------  All laboratory and radiology results have been personally reviewed by myself   LABS:  Results for orders placed or performed during the hospital encounter of 01/23/21   CBC auto differential   Result Value Ref Range    WBC 11.3 4.5 - 11.5 E9/L    RBC 4.35 3.80 - 5.80 E12/L    Hemoglobin 13.8 12.5 - 16.5 g/dL    Hematocrit 41.1 37.0 - 54.0 %    MCV 94.5 80.0 - 99.9 fL    MCH 31.7 26.0 - 35.0 pg    MCHC 33.6 32.0 - 34.5 %    RDW 13.1 11.5 - 15.0 fL    Platelets 855 646 - 226 E9/L    MPV 11.1 7.0 - 12.0 fL    Neutrophils % 62.1 43.0 - 80.0 % Immature Granulocytes % 0.4 0.0 - 5.0 %    Lymphocytes % 28.3 20.0 - 42.0 %    Monocytes % 7.0 2.0 - 12.0 %    Eosinophils % 1.8 0.0 - 6.0 %    Basophils % 0.4 0.0 - 2.0 %    Neutrophils Absolute 7.04 1.80 - 7.30 E9/L    Immature Granulocytes # 0.04 E9/L    Lymphocytes Absolute 3.20 1.50 - 4.00 E9/L    Monocytes Absolute 0.79 0.10 - 0.95 E9/L    Eosinophils Absolute 0.20 0.05 - 0.50 E9/L    Basophils Absolute 0.04 0.00 - 0.20 N2/A   Basic Metabolic Panel w/ Reflex to MG   Result Value Ref Range    Sodium 135 132 - 146 mmol/L    Potassium reflex Magnesium 4.0 3.5 - 5.0 mmol/L    Chloride 100 98 - 107 mmol/L    CO2 27 22 - 29 mmol/L    Anion Gap 8 7 - 16 mmol/L    Glucose 219 (H) 74 - 99 mg/dL    BUN 9 6 - 20 mg/dL    CREATININE 1.1 0.7 - 1.2 mg/dL    GFR Non-African American >60 >=60 mL/min/1.73    GFR African American >60     Calcium 9.0 8.6 - 10.2 mg/dL       RADIOLOGY:  Interpreted by Radiologist.  CT ABDOMEN PELVIS W IV CONTRAST Additional Contrast? None   Final Result   An area of inflammatory changes with skin thickening and edema in the lower   anterior pelvic wall and right groin concerning for cellulitis. There is   inflammatory lymph nodes in the groin. There is no gangrene or drainable   abscess collection. Small-bowel ileus. Mild thickening of the urinary bladder. Please correlate   with urinalysis. ------------------------- NURSING NOTES AND VITALS REVIEWED ---------------------------   The nursing notes within the ED encounter and vital signs as below have been reviewed.    BP (!) 160/95   Pulse 67   Temp 98.9 °F (37.2 °C)   Resp 18   Ht 5' 9\" (1.753 m)   Wt 247 lb (112 kg)   SpO2 97%   BMI 36.48 kg/m²   Oxygen Saturation Interpretation: Normal      ---------------------------------------------------PHYSICAL EXAM--------------------------------------      Constitutional/General: Alert and oriented x3, well appearing, non toxic in NAD  Head: Normocephalic and atraumatic  Eyes: PERRL, EOMI  Mouth: Oropharynx clear, handling secretions, no trismus  Neck: Supple, full ROM,   Pulmonary: Lungs clear to auscultation bilaterally, no wheezes, rales, or rhonchi. Not in respiratory distress  Cardiovascular:  Regular rate and rhythm, no murmurs, gallops, or rubs. 2+ distal pulses  Abdomen: Soft, tender suprapubic, non distended, there is erythema with firmness of the pelvic groin area present. Extremities: Moves all extremities x 4. Warm and well perfused  Skin: warm and dry without rash  Neurologic: GCS 15,  Psych: Normal Affect      ------------------------------ ED COURSE/MEDICAL DECISION MAKING----------------------  Medications   piperacillin-tazobactam (ZOSYN) 3,375 mg in dextrose 5 % 50 mL IVPB extended infusion (mini-bag) (has no administration in time range)   vancomycin (VANCOCIN) 1,750 mg in dextrose 5 % 500 mL IVPB (has no administration in time range)   0.9 % sodium chloride bolus (0 mLs Intravenous Stopped 1/23/21 2124)   fentaNYL (SUBLIMAZE) injection 25 mcg (25 mcg Intravenous Given 1/23/21 2020)   ondansetron (ZOFRAN) injection 4 mg (4 mg Intravenous Given 1/23/21 2021)   iopamidol (ISOVUE-370) 76 % injection 75 mL (75 mLs Intravenous Given 1/23/21 2103)   morphine injection 4 mg (4 mg Intravenous Given 1/23/21 2205)         ED COURSE:      2125 patient updated on lab findings. He states his pain persist awaiting CT results     2210 patient updated on CT findings plan for admission for cellulitis he is agreeable. 2220 discussed with Dr. Eustacio Leventhal he is agreeable to admission. Medical Decision Making:   Patient comes in with complaint of groin pain that started 5 days ago progressively worse. He has history of diabetes. CT was obtained cellulitis is present there is no finding of forniers or abscess. Patient was given Zosyn and vancomycin will be admitted to medicine      Counseling:    The emergency provider has spoken with the patient and discussed todays results, in addition to providing specific details for the plan of care and counseling regarding the diagnosis and prognosis. Questions are answered at this time and they are agreeable with the plan.      --------------------------------- IMPRESSION AND DISPOSITION ---------------------------------    IMPRESSION  1. Cellulitis of groin        DISPOSITION  Disposition: Admit to med/surg floor  Patient condition is fair      NOTE: This report was transcribed using voice recognition software.  Every effort was made to ensure accuracy; however, inadvertent computerized transcription errors may be present     Madison Jose Raul Duran  01/23/21 3138

## 2021-01-24 NOTE — CONSULTS
Yes     Partners: Female   Lifestyle    Physical activity     Days per week: Not on file     Minutes per session: Not on file    Stress: Not on file   Relationships    Social connections     Talks on phone: Not on file     Gets together: Not on file     Attends Yarsanism service: Not on file     Active member of club or organization: Not on file     Attends meetings of clubs or organizations: Not on file     Relationship status: Not on file    Intimate partner violence     Fear of current or ex partner: Not on file     Emotionally abused: Not on file     Physically abused: Not on file     Forced sexual activity: Not on file   Other Topics Concern    Not on file   Social History Narrative    Not on file       The patient has a family history that is negative for severe cardiovascular or respiratory issues, negative for reaction to anesthesia. Recent Labs     01/23/21 2021 01/24/21  0517   WBC 11.3 11.8*   HGB 13.8 13.6   HCT 41.1 41.5   MCV 94.5 94.1    232       Recent Labs     01/23/21 2021 01/24/21  0517    136   K 4.0 3.7   CO2 27 24   PHOS  --  2.9   BUN 9 8   CREATININE 1.1 1.0       Recent Labs     01/24/21  0517   PROT 7.6         Intake/Output Summary (Last 24 hours) at 1/24/2021 1215  Last data filed at 1/24/2021 0810  Gross per 24 hour   Intake 240 ml   Output 225 ml   Net 15 ml       I have reviewed relevant labs from this admission and interpretation is included in my assessment and plan      Review of Systems  A complete 10 system review was performed and are otherwise negative unless mentioned in the above HPI. Specific negatives are listed below but may not include all those reviewed.     General ROS: negative obtundation, AMS  ENT ROS: negative rhinorrhea, epistaxis  Allergy and Immunology ROS: negative itchy/watery eyes or nasal congestion  Hematological and Lymphatic ROS: negative spontaneous bleeding or bruising  Endocrine ROS: negative  lethargy, mood swings, palpitations or polydipsia/polyuria  Respiratory ROS: negative sputum changes, stridor, tachypnea or wheezing  Cardiovascular ROS: negative for - loss of consciousness, murmur or orthopnea  Gastrointestinal ROS: negative for - hematochezia or hematemesis  Genito-Urinary ROS: negative for -  genital discharge or hematuria  Musculoskeletal ROS: negative for - focal weakness, gangrene  Psych/Neuro ROS: negative for - visual or auditory hallucinations, suicidal ideation      Physical exam:   BP (!) 188/98   Pulse 82   Temp 98 °F (36.7 °C) (Oral)   Resp 18   Ht 5' 9\" (1.753 m)   Wt 247 lb (112 kg)   SpO2 98%   BMI 36.48 kg/m²   General appearance:  NAD, appears stated age  Head: NCAT, PERRLA, EOMI, red conjunctiva  Neck: supple, no masses, trachea midline  Lungs: Equal chest rise bilateral, no retractions, no wheezing  Heart: Reg rate  Abdomen: soft, obese, suprapubic induration with cellulitis warm to the touch with purulence expressed from focal point in the middle  Skin; warm and dry, no cyanosis  Gu: no cva tenderness  Extremities: atraumatic, no focal motor deficits, no open wounds  Psych: No tremor, visual hallucinations        Radiology: I reviewed relevant abdominal imaging from this admission and that available in the EMR including CT abd/pel from admission. My assessment is cellulitis however on physical exam he has an obvious abscess    Assessment:  Kendla Peguero is a 48 y.o. male with suprapubic abscess    Patient Active Problem List   Diagnosis    Abdominal pain    Panniculitis?  Colitis?  Near syncope    Cellulitis       Plan:  I&D bedside  IV antibiotics per medicine  Glucose control  Time spent reviewing past medical, surgical, social and family history, vitals, nursing assessment and images. Time spent face to face with patient and family counciling and discussing care exceeded 50% of the time of the consult.  Additional time spent reviewing images and labs, discussing case with nursing, support staff and other physicians; as well as coordinating care.         Physician Signature: Electronically signed by Dr. Arnulfo Elizabeth  [phone] (p)

## 2021-01-25 LAB
ALBUMIN SERPL-MCNC: 3.7 G/DL (ref 3.5–5.2)
ALP BLD-CCNC: 64 U/L (ref 40–129)
ALT SERPL-CCNC: 21 U/L (ref 0–40)
ANION GAP SERPL CALCULATED.3IONS-SCNC: 10 MMOL/L (ref 7–16)
AST SERPL-CCNC: 13 U/L (ref 0–39)
BASOPHILS ABSOLUTE: 0.04 E9/L (ref 0–0.2)
BASOPHILS RELATIVE PERCENT: 0.4 % (ref 0–2)
BILIRUB SERPL-MCNC: 0.5 MG/DL (ref 0–1.2)
BUN BLDV-MCNC: 9 MG/DL (ref 6–20)
CALCIUM SERPL-MCNC: 8.3 MG/DL (ref 8.6–10.2)
CHLORIDE BLD-SCNC: 101 MMOL/L (ref 98–107)
CO2: 24 MMOL/L (ref 22–29)
CREAT SERPL-MCNC: 1 MG/DL (ref 0.7–1.2)
EOSINOPHILS ABSOLUTE: 0.19 E9/L (ref 0.05–0.5)
EOSINOPHILS RELATIVE PERCENT: 1.7 % (ref 0–6)
GFR AFRICAN AMERICAN: >60
GFR NON-AFRICAN AMERICAN: >60 ML/MIN/1.73
GLUCOSE BLD-MCNC: 200 MG/DL (ref 74–99)
HCT VFR BLD CALC: 37.3 % (ref 37–54)
HEMOGLOBIN: 12.3 G/DL (ref 12.5–16.5)
IMMATURE GRANULOCYTES #: 0.05 E9/L
IMMATURE GRANULOCYTES %: 0.4 % (ref 0–5)
LYMPHOCYTES ABSOLUTE: 2.66 E9/L (ref 1.5–4)
LYMPHOCYTES RELATIVE PERCENT: 23.8 % (ref 20–42)
MCH RBC QN AUTO: 30.9 PG (ref 26–35)
MCHC RBC AUTO-ENTMCNC: 33 % (ref 32–34.5)
MCV RBC AUTO: 93.7 FL (ref 80–99.9)
METER GLUCOSE: 187 MG/DL (ref 74–99)
METER GLUCOSE: 209 MG/DL (ref 74–99)
METER GLUCOSE: 234 MG/DL (ref 74–99)
METER GLUCOSE: 246 MG/DL (ref 74–99)
METER GLUCOSE: 329 MG/DL (ref 74–99)
MONOCYTES ABSOLUTE: 0.97 E9/L (ref 0.1–0.95)
MONOCYTES RELATIVE PERCENT: 8.7 % (ref 2–12)
NEUTROPHILS ABSOLUTE: 7.26 E9/L (ref 1.8–7.3)
NEUTROPHILS RELATIVE PERCENT: 65 % (ref 43–80)
PDW BLD-RTO: 13.1 FL (ref 11.5–15)
PLATELET # BLD: 235 E9/L (ref 130–450)
PMV BLD AUTO: 11.1 FL (ref 7–12)
POTASSIUM SERPL-SCNC: 3.6 MMOL/L (ref 3.5–5)
RBC # BLD: 3.98 E12/L (ref 3.8–5.8)
SODIUM BLD-SCNC: 135 MMOL/L (ref 132–146)
TOTAL PROTEIN: 7.1 G/DL (ref 6.4–8.3)
TSH SERPL DL<=0.05 MIU/L-ACNC: 1.76 UIU/ML (ref 0.27–4.2)
VANCOMYCIN TROUGH: 9.7 MCG/ML (ref 5–16)
WBC # BLD: 11.2 E9/L (ref 4.5–11.5)

## 2021-01-25 PROCEDURE — 6360000002 HC RX W HCPCS: Performed by: INTERNAL MEDICINE

## 2021-01-25 PROCEDURE — 1200000000 HC SEMI PRIVATE

## 2021-01-25 PROCEDURE — 84443 ASSAY THYROID STIM HORMONE: CPT

## 2021-01-25 PROCEDURE — 85025 COMPLETE CBC W/AUTO DIFF WBC: CPT

## 2021-01-25 PROCEDURE — 94640 AIRWAY INHALATION TREATMENT: CPT

## 2021-01-25 PROCEDURE — 6370000000 HC RX 637 (ALT 250 FOR IP): Performed by: INTERNAL MEDICINE

## 2021-01-25 PROCEDURE — 2580000003 HC RX 258: Performed by: INTERNAL MEDICINE

## 2021-01-25 PROCEDURE — 80202 ASSAY OF VANCOMYCIN: CPT

## 2021-01-25 PROCEDURE — 36415 COLL VENOUS BLD VENIPUNCTURE: CPT

## 2021-01-25 PROCEDURE — 82962 GLUCOSE BLOOD TEST: CPT

## 2021-01-25 PROCEDURE — 2500000003 HC RX 250 WO HCPCS: Performed by: INTERNAL MEDICINE

## 2021-01-25 PROCEDURE — 80053 COMPREHEN METABOLIC PANEL: CPT

## 2021-01-25 RX ORDER — DILTIAZEM HYDROCHLORIDE 300 MG/1
300 CAPSULE, COATED, EXTENDED RELEASE ORAL DAILY
Status: DISCONTINUED | OUTPATIENT
Start: 2021-01-26 | End: 2021-01-26 | Stop reason: CLARIF

## 2021-01-25 RX ORDER — LISINOPRIL 20 MG/1
40 TABLET ORAL DAILY
Status: DISCONTINUED | OUTPATIENT
Start: 2021-01-26 | End: 2021-01-26 | Stop reason: HOSPADM

## 2021-01-25 RX ORDER — HYDROCHLOROTHIAZIDE 12.5 MG/1
12.5 TABLET ORAL ONCE
Status: COMPLETED | OUTPATIENT
Start: 2021-01-25 | End: 2021-01-25

## 2021-01-25 RX ORDER — LISINOPRIL 20 MG/1
20 TABLET ORAL ONCE
Status: COMPLETED | OUTPATIENT
Start: 2021-01-25 | End: 2021-01-25

## 2021-01-25 RX ORDER — HYDROCHLOROTHIAZIDE 25 MG/1
25 TABLET ORAL EVERY MORNING
Status: DISCONTINUED | OUTPATIENT
Start: 2021-01-26 | End: 2021-01-26 | Stop reason: HOSPADM

## 2021-01-25 RX ADMIN — BUDESONIDE 500 MCG: 0.5 INHALANT RESPIRATORY (INHALATION) at 19:59

## 2021-01-25 RX ADMIN — HYDROCHLOROTHIAZIDE 12.5 MG: 12.5 TABLET ORAL at 07:53

## 2021-01-25 RX ADMIN — BUDESONIDE 500 MCG: 0.5 INHALANT RESPIRATORY (INHALATION) at 06:22

## 2021-01-25 RX ADMIN — ARFORMOTEROL TARTRATE 15 MCG: 15 SOLUTION RESPIRATORY (INHALATION) at 06:22

## 2021-01-25 RX ADMIN — METRONIDAZOLE 500 MG: 500 INJECTION, SOLUTION INTRAVENOUS at 05:11

## 2021-01-25 RX ADMIN — INSULIN LISPRO 1 UNITS: 100 INJECTION, SOLUTION INTRAVENOUS; SUBCUTANEOUS at 16:59

## 2021-01-25 RX ADMIN — CEFEPIME HYDROCHLORIDE 2000 MG: 2 INJECTION, POWDER, FOR SOLUTION INTRAVENOUS at 08:04

## 2021-01-25 RX ADMIN — METRONIDAZOLE 500 MG: 500 INJECTION, SOLUTION INTRAVENOUS at 13:07

## 2021-01-25 RX ADMIN — LISINOPRIL 20 MG: 20 TABLET ORAL at 07:53

## 2021-01-25 RX ADMIN — DILTIAZEM HYDROCHLORIDE 240 MG: 120 CAPSULE, COATED, EXTENDED RELEASE ORAL at 07:53

## 2021-01-25 RX ADMIN — ALOGLIPTIN 25 MG: 25 TABLET, FILM COATED ORAL at 11:46

## 2021-01-25 RX ADMIN — OXYCODONE HYDROCHLORIDE AND ACETAMINOPHEN 1 TABLET: 10; 325 TABLET ORAL at 14:43

## 2021-01-25 RX ADMIN — ASPIRIN 81 MG: 81 TABLET, COATED ORAL at 07:53

## 2021-01-25 RX ADMIN — HYDROCHLOROTHIAZIDE 12.5 MG: 12.5 TABLET ORAL at 11:46

## 2021-01-25 RX ADMIN — METRONIDAZOLE 500 MG: 500 INJECTION, SOLUTION INTRAVENOUS at 20:35

## 2021-01-25 RX ADMIN — ENOXAPARIN SODIUM 40 MG: 40 INJECTION SUBCUTANEOUS at 07:53

## 2021-01-25 RX ADMIN — INSULIN LISPRO 2 UNITS: 100 INJECTION, SOLUTION INTRAVENOUS; SUBCUTANEOUS at 11:46

## 2021-01-25 RX ADMIN — INSULIN LISPRO 2 UNITS: 100 INJECTION, SOLUTION INTRAVENOUS; SUBCUTANEOUS at 07:54

## 2021-01-25 RX ADMIN — VANCOMYCIN HYDROCHLORIDE 1500 MG: 10 INJECTION, POWDER, LYOPHILIZED, FOR SOLUTION INTRAVENOUS at 00:30

## 2021-01-25 RX ADMIN — ARFORMOTEROL TARTRATE 15 MCG: 15 SOLUTION RESPIRATORY (INHALATION) at 19:59

## 2021-01-25 RX ADMIN — VANCOMYCIN HYDROCHLORIDE 1500 MG: 10 INJECTION, POWDER, LYOPHILIZED, FOR SOLUTION INTRAVENOUS at 17:16

## 2021-01-25 RX ADMIN — INSULIN LISPRO 1 UNITS: 100 INJECTION, SOLUTION INTRAVENOUS; SUBCUTANEOUS at 20:38

## 2021-01-25 RX ADMIN — ATORVASTATIN CALCIUM 20 MG: 20 TABLET, FILM COATED ORAL at 20:33

## 2021-01-25 RX ADMIN — LISINOPRIL 20 MG: 20 TABLET ORAL at 11:44

## 2021-01-25 NOTE — PROGRESS NOTES
Department of Internal Medicine  History and Physical    PCP: Dr. Phyllis Dolan. Admitting Physician: Dr. Renetta Corley  Consultants:       CHIEF COMPLAINT: Right lower quadrant and suprapubic swelling and pain    HISTORY OF PRESENT ILLNESS:    Patient is a 60-year-old male who presented to the ED due to right lower quadrant and suprapubic abdominal pain and swelling. States it started about a week ago. From the start to about 2 to 3 days later he developed increased swelling in the area along with pain. This continued and as such she presented today for further evaluation and treatment. States that his belt buckle rubbing up against his abdomen could have caused this. However denies any other trauma prior to developing this issue. Denies fever or chills. This has never occurred before. The emergency room stuck a needle in the thing with a small amount of pus coming out. BBC in the ED initially 11.3. Other routine lab was unremarkable except for random blood sugar 219. This morning WBC 11.8 with hemoglobin A1c of 10. Fasting blood sugar was 204. Temperature 98 degrees with a heart rate of 82 and blood pressure currently 188/98 with O2 sat 98% on room air. Physical exam showed suprapubic area to have some firmness along with edema and tenderness on palpation. Patient denies any problem with chest pain, nausea/vomiting, shortness of breath or dizziness. 1/25/2021  Patient seen and examined on general medical floor. Patient complains of some suprapubic tenderness but is moderately improved since admission. General surgery did bedside I&D of suprapubic area yesterday afternoon. Nursing called last night with patient having increasing bleeding around incisional site for about 2 hours. General surgery applied pressure dressing. Blood sugars ranging 179-263. Patient states he normally does not follow-up with Dr. Robert Carballo very often but handles a lot of it by phone.   BUN/creatinine was 9/1.0 with normal liver enzymes and WBC has improved to 11.2 from yesterday afternoon with hemoglobin 12.3 with an admission 13.6. Preliminary cultures show staph aureus. Patient denies abdominal chest pain, upper-mid abdominal Pain, nausea vomiting or unusual shortness of breath. General surgery is now seen patient today. PAST MEDICAL Hx:  Past Medical History:   Diagnosis Date    Asthma     Diabetes mellitus ([address]     H/O cardiovascular stress test 12/28/2016    lexiscan    Hypertension     Insomnia     Shoulder pain        PAST SURGICAL Hx:   History reviewed. No pertinent surgical history. FAMILY Hx:  Family History   Problem Relation Age of Onset    Diabetes Mother        HOME MEDICATIONS:  Prior to Admission medications    Medication Sig Start Date End Date Taking? Authorizing Provider   lisinopril (PRINIVIL;ZESTRIL) 20 MG tablet Take 1 tablet by mouth daily 12/28/16  Yes Arun Stock MD   atorvastatin (LIPITOR) 20 MG tablet Take 1 tablet by mouth daily 12/28/16  Yes Arun Stock MD   diltiazem (CARTIA XT) 240 MG ER capsule Take 1 capsule by mouth daily 7/18/16  Yes Douglas Valiente, DO   hydrochlorothiazide (MICROZIDE) 12.5 MG capsule Take 12.5 mg by mouth every morning   Yes Historical Provider, MD   sitaGLIPtin (JANUVIA) 100 MG tablet Take 100 mg by mouth daily   Yes Historical Provider, MD   mometasone-formoterol [facility]) 200-5 MCG/ACT inhaler Inhale 2 puffs into the lungs every 12 hours 4/27/15  Yes Alex Young., DO   albuterol (PROVENTIL HFA) 108 (90 BASE) MCG/ACT inhaler Inhale 2 puffs into the lungs every 6 hours as needed for Wheezing 4/27/15  Yes Alex Young., DO   aspirin (ECOTRIN LOW STRENGTH) 81 MG EC tablet Take 1 tablet by mouth daily 12/28/16   Arun Stock MD       ALLERGIES:  Patient has no known allergies.     SOCIAL Hx:  Social History     Socioeconomic History    Marital status: Single     Spouse name: Not on file    Number of children: Not on file    Years of education: Not on file    Highest education level: Not on file   Occupational History    Not on file   Social Needs    Financial resource strain: Not on file    Food insecurity     Worry: Not on file     Inability: Not on file    Transportation needs     Medical: Not on file     Non-medical: Not on file   Tobacco Use    Smoking status: Never Smoker    Smokeless tobacco: Never Used   Substance and Sexual Activity    Alcohol use: No    Drug use: No    Sexual activity: Yes     Partners: Female   Lifestyle    Physical activity     Days per week: Not on file     Minutes per session: Not on file    Stress: Not on file   Relationships    Social connections     Talks on phone: Not on file     Gets together: Not on file     Attends Jainism service: Not on file     Active member of club or organization: Not on file     Attends meetings of clubs or organizations: Not on file     Relationship status: Not on file    Intimate partner violence     Fear of current or ex partner: Not on file     Emotionally abused: Not on file     Physically abused: Not on file     Forced sexual activity: Not on file   Other Topics Concern    Not on file   Social History Narrative    Not on file       ROS: Positive in bold  General:   Denies chills, fatigue, fever, malaise, night sweats or weight loss    Psychological:   Denies anxiety, disorientation or hallucinations    ENT:    Denies epistaxis, headaches, vertigo or visual changes    Cardiovascular:   Denies any chest pain, irregular heartbeats, or palpitations. No paroxysmal nocturnal dyspnea. Respiratory:   Denies shortness of breath, coughing, sputum production, hemoptysis, or wheezing. No orthopnea. Gastrointestinal:   Denies nausea, vomiting, diarrhea, or constipation. Denies any abdominal pain. Denies change in bowel habits or stools. Genito-Urinary:    Denies any urgency, frequency, hematuria. Voiding without difficulty.     Musculoskeletal:   Denies joint pain, joint edema, cyanosis, or swelling. LABORATORY DATA:  CBC with Differential:    Lab Results   Component Value Date    WBC 11.2 01/25/2021    RBC 3.98 01/25/2021    HGB 12.3 01/25/2021    HCT 37.3 01/25/2021     01/25/2021    MCV 93.7 01/25/2021    MCH 30.9 01/25/2021    MCHC 33.0 01/25/2021    RDW 13.1 01/25/2021    LYMPHOPCT 23.8 01/25/2021    MONOPCT 8.7 01/25/2021    BASOPCT 0.4 01/25/2021    MONOSABS 0.97 01/25/2021    LYMPHSABS 2.66 01/25/2021    EOSABS 0.19 01/25/2021    BASOSABS 0.04 01/25/2021     CMP:    Lab Results   Component Value Date     01/25/2021    K 3.6 01/25/2021    K 4.0 01/23/2021     01/25/2021    CO2 24 01/25/2021    BUN 9 01/25/2021    CREATININE 1.0 01/25/2021    GFRAA >60 01/25/2021    LABGLOM >60 01/25/2021    GLUCOSE 200 01/25/2021    PROT 7.1 01/25/2021    LABALBU 3.7 01/25/2021    CALCIUM 8.3 01/25/2021    BILITOT 0.5 01/25/2021    ALKPHOS 64 01/25/2021    AST 13 01/25/2021    ALT 21 01/25/2021       ASSESSMENT:  1. Cellulitis/small abscess of suprapubic area and right lower quadrant with possible early abscess  2. Ileus and cystitis per CT scan  3. Non insulin dependent diabetes mellitus-hemoglobin A1c is 10  4. Asthma  5. Hypertension-uncontrolled   6. Obesity    Plan:  Admit to general medical floor  Home medication reviewed    Percocet 5 mg every 4 hours as needed pain. IV Vancomycin and Zosyn. Continue IV fluids. Consult general surgery  Glucoscans 4 times daily with sliding scale insulin  Start Metformin 500 mg twice daily  Apresoline 10 mg IV push every 4 hours as needed for systolic greater than 458 or diastolic greater than 142    Preliminary cultures of wound was staph aureus  Stop IV Zosyn  Pending final wound culture  Patient lectured about diet control and modify his diabetic regimen with his hemoglobin A1c being 10.0.   Lisinopril increased to 40 mg daily  Hydrochlorothiazide increase 25 mg daily  Cardizem increased 300 mg daily  Metformin increased to 1 g twice daily      Douglas Valiente D.O.  9:15 AM  1/25/2021

## 2021-01-25 NOTE — PROGRESS NOTES
,  An interaction between diltiazem and atorvastatin has been noted where atorvastatin increases the serum concentration of diltiazem. Please consider using lower doses of atorvastatin when used together with diltiazem, and monitor closely for signs of HMG-CoA reductase inhibitor toxicity. Alternatively, rosuvastatin(formulary) may be less affected by diltiazem. Thank you.   Jenn Delcid [facility]  1/25/2021, 9:57 AM

## 2021-01-25 NOTE — CARE COORDINATION
CM note: Met with patient for transition of care planning, patient is anxious for discharge and is waiting on general surgery. Pt states that he lives with his daughter, he is independent with ADLs, no hx of DME/HHC. PCP is Dr. Marianna Rodríguez and he uses the [facility] on [address] for medications. Patient is hopeful for oral antibiotics, unknown needs for dressing changes but patient does not seem receptive to home care.

## 2021-01-26 VITALS
SYSTOLIC BLOOD PRESSURE: 147 MMHG | HEIGHT: 69 IN | TEMPERATURE: 98.1 F | OXYGEN SATURATION: 93 % | RESPIRATION RATE: 16 BRPM | BODY MASS INDEX: 36.58 KG/M2 | WEIGHT: 247 LBS | DIASTOLIC BLOOD PRESSURE: 87 MMHG | HEART RATE: 82 BPM

## 2021-01-26 LAB
METER GLUCOSE: 220 MG/DL (ref 74–99)
METER GLUCOSE: 270 MG/DL (ref 74–99)
METER GLUCOSE: 299 MG/DL (ref 74–99)
ORGANISM: ABNORMAL
WOUND/ABSCESS: ABNORMAL

## 2021-01-26 PROCEDURE — 2580000003 HC RX 258: Performed by: INTERNAL MEDICINE

## 2021-01-26 PROCEDURE — 82962 GLUCOSE BLOOD TEST: CPT

## 2021-01-26 PROCEDURE — 2500000003 HC RX 250 WO HCPCS: Performed by: INTERNAL MEDICINE

## 2021-01-26 PROCEDURE — 6370000000 HC RX 637 (ALT 250 FOR IP): Performed by: INTERNAL MEDICINE

## 2021-01-26 PROCEDURE — 6360000002 HC RX W HCPCS: Performed by: INTERNAL MEDICINE

## 2021-01-26 PROCEDURE — 94640 AIRWAY INHALATION TREATMENT: CPT

## 2021-01-26 RX ORDER — HYDROCODONE BITARTRATE AND ACETAMINOPHEN 5; 325 MG/1; MG/1
1 TABLET ORAL EVERY 6 HOURS PRN
Qty: 16 TABLET | Refills: 0 | Status: SHIPPED | OUTPATIENT
Start: 2021-01-26 | End: 2021-01-31

## 2021-01-26 RX ORDER — SULFAMETHOXAZOLE AND TRIMETHOPRIM 800; 160 MG/1; MG/1
1 TABLET ORAL 2 TIMES DAILY
Qty: 24 TABLET | Refills: 0 | Status: SHIPPED | OUTPATIENT
Start: 2021-01-26 | End: 2021-02-03 | Stop reason: SDUPTHER

## 2021-01-26 RX ORDER — METOPROLOL SUCCINATE 50 MG/1
50 TABLET, EXTENDED RELEASE ORAL DAILY
Qty: 30 TABLET | Refills: 3 | Status: SHIPPED | OUTPATIENT
Start: 2021-01-26

## 2021-01-26 RX ORDER — DOXYCYCLINE HYCLATE 100 MG/1
100 CAPSULE ORAL 2 TIMES DAILY
Qty: 20 CAPSULE | Refills: 0 | Status: SHIPPED | OUTPATIENT
Start: 2021-01-26 | End: 2021-02-05

## 2021-01-26 RX ORDER — HYDROCODONE BITARTRATE AND ACETAMINOPHEN 5; 325 MG/1; MG/1
1 TABLET ORAL EVERY 4 HOURS PRN
Qty: 30 TABLET | Refills: 0 | Status: SHIPPED | OUTPATIENT
Start: 2021-01-26 | End: 2021-01-31

## 2021-01-26 RX ADMIN — DILTIAZEM HYDROCHLORIDE 300 MG: 180 CAPSULE, COATED, EXTENDED RELEASE ORAL at 11:38

## 2021-01-26 RX ADMIN — HYDROCHLOROTHIAZIDE 25 MG: 25 TABLET ORAL at 08:17

## 2021-01-26 RX ADMIN — BUDESONIDE 500 MCG: 0.5 INHALANT RESPIRATORY (INHALATION) at 06:02

## 2021-01-26 RX ADMIN — ASPIRIN 81 MG: 81 TABLET, COATED ORAL at 08:18

## 2021-01-26 RX ADMIN — ARFORMOTEROL TARTRATE 15 MCG: 15 SOLUTION RESPIRATORY (INHALATION) at 06:02

## 2021-01-26 RX ADMIN — ENOXAPARIN SODIUM 40 MG: 40 INJECTION SUBCUTANEOUS at 08:17

## 2021-01-26 RX ADMIN — VANCOMYCIN HYDROCHLORIDE 1500 MG: 10 INJECTION, POWDER, LYOPHILIZED, FOR SOLUTION INTRAVENOUS at 04:49

## 2021-01-26 RX ADMIN — METFORMIN HYDROCHLORIDE 1000 MG: 1000 TABLET ORAL at 08:17

## 2021-01-26 RX ADMIN — METRONIDAZOLE 500 MG: 500 INJECTION, SOLUTION INTRAVENOUS at 08:18

## 2021-01-26 RX ADMIN — OXYCODONE HYDROCHLORIDE AND ACETAMINOPHEN 1 TABLET: 10; 325 TABLET ORAL at 08:21

## 2021-01-26 RX ADMIN — INSULIN LISPRO 3 UNITS: 100 INJECTION, SOLUTION INTRAVENOUS; SUBCUTANEOUS at 11:39

## 2021-01-26 RX ADMIN — INSULIN LISPRO 2 UNITS: 100 INJECTION, SOLUTION INTRAVENOUS; SUBCUTANEOUS at 08:22

## 2021-01-26 RX ADMIN — ALOGLIPTIN 25 MG: 25 TABLET, FILM COATED ORAL at 08:17

## 2021-01-26 RX ADMIN — LISINOPRIL 40 MG: 20 TABLET ORAL at 08:17

## 2021-01-26 ASSESSMENT — PAIN SCALES - GENERAL: PAINLEVEL_OUTOF10: 7

## 2021-01-26 ASSESSMENT — PAIN DESCRIPTION - DESCRIPTORS: DESCRIPTORS: ACHING;DISCOMFORT;SORE

## 2021-01-26 NOTE — DISCHARGE SUMMARY
Department of Internal Medicine  DS    PCP: Dr. Lori Ngo. Admitting Physician: Dr. Brent Butt  Consultants:       CHIEF COMPLAINT: Right lower quadrant and suprapubic swelling and pain    HISTORY OF PRESENT ILLNESS:    Patient is a 23-year-old male who presented to the ED due to right lower quadrant and suprapubic abdominal pain and swelling. States it started about a week ago. From the start to about 2 to 3 days later he developed increased swelling in the area along with pain. This continued and as such she presented today for further evaluation and treatment. States that his belt buckle rubbing up against his abdomen could have caused this. However denies any other trauma prior to developing this issue. Denies fever or chills. This has never occurred before. The emergency room stuck a needle in the thing with a small amount of pus coming out. [facility] in the ED initially 11.3. Other routine lab was unremarkable except for random blood sugar 219. This morning WBC 11.8 with hemoglobin A1c of 10. Fasting blood sugar was 204. Temperature 98 degrees with a heart rate of 82 and blood pressure currently 188/98 with O2 sat 98% on room air. Physical exam showed suprapubic area to have some firmness along with edema and tenderness on palpation. Patient denies any problem with chest pain, nausea/vomiting, shortness of breath or dizziness. 1/25/2021  Patient seen and examined on general medical floor. Patient complains of some suprapubic tenderness but is moderately improved since admission. General surgery did bedside I&D of suprapubic area yesterday afternoon. Nursing called last night with patient having increasing bleeding around incisional site for about 2 hours. General surgery applied pressure dressing. Blood sugars ranging 179-263. Patient states he normally does not follow-up with Dr. Ryan Allen very often but handles a lot of it by phone.   BUN/creatinine was 9/1.0 with normal liver enzymes and WBC has improved to 11.2 from yesterday afternoon with hemoglobin 12.3 with an admission 13.6. Preliminary cultures show staph aureus. Patient denies abdominal chest pain, upper-mid abdominal Pain, nausea vomiting or unusual shortness of breath. General surgery is now seen patient today. 1/26/2021  Patient seen and examined on general medical floor. Patient feels a lot better today. Patient still has some suprapubic discomfort but is improved. Patient still has fullness in the soft tissue of the suprapubic area but is improved. Patient denies any chest pain, abdominal pain, nausea vomiting or unusual shortness of breath. Blood sugars ranging 220-246. Wound cultures positive for MRSA. Temperature 98.1 with heart rate 82. Blood pressure 140s over 87. O2 sats 93-96% on room air at rest.  Urinary output is adequate. Patient's blood sugars are doing much better with the addition of Metformin and the patient being on a more controlled diet. Also blood pressure has finally improved with modification of some of his blood pressure pills. Patient is on Lipitor and Cardizem and pharmacy is requesting to decrease Lipitor which cannot be done secondary to patient's LDL is already 183. With this we will stop the Cardizem and start Toprol-XL 50 mg a day. Discharge home today if okay with general surgery and patient follow-up with primary care physician    PAST MEDICAL Hx:  Past Medical History:   Diagnosis Date    Asthma     Diabetes mellitus ([address]     H/O cardiovascular stress test 12/28/2016    lexiscan    Hypertension     Insomnia     Shoulder pain        PAST SURGICAL Hx:   History reviewed. No pertinent surgical history. FAMILY Hx:  Family History   Problem Relation Age of Onset    Diabetes Mother        HOME MEDICATIONS:  Prior to Admission medications    Medication Sig Start Date End Date Taking?  Authorizing Provider   lisinopril (PRINIVIL;ZESTRIL) 20 MG tablet Take 1 tablet by mouth daily 12/28/16 Yes Chrissy Cheng MD   atorvastatin (LIPITOR) 20 MG tablet Take 1 tablet by mouth daily 12/28/16  Yes Chrissy Cheng MD   diltiazem (CARTIA XT) 240 MG ER capsule Take 1 capsule by mouth daily 7/18/16  Yes Erika Oliva,    hydrochlorothiazide (MICROZIDE) 12.5 MG capsule Take 12.5 mg by mouth every morning   Yes Historical Provider, MD   sitaGLIPtin (JANUVIA) 100 MG tablet Take 100 mg by mouth daily   Yes Historical Provider, MD   mometasone-formoterol [facility]) 200-5 MCG/ACT inhaler Inhale 2 puffs into the lungs every 12 hours 4/27/15  Yes Aliya Kim, DO   albuterol (PROVENTIL HFA) 108 (90 BASE) MCG/ACT inhaler Inhale 2 puffs into the lungs every 6 hours as needed for Wheezing 4/27/15  Yes Aliya Kim, DO   aspirin (ECOTRIN LOW STRENGTH) 81 MG EC tablet Take 1 tablet by mouth daily 12/28/16   Chrissy Cheng MD       ALLERGIES:  Patient has no known allergies.     SOCIAL Hx:  Social History     Socioeconomic History    Marital status: Single     Spouse name: Not on file    Number of children: Not on file    Years of education: Not on file    Highest education level: Not on file   Occupational History    Not on file   Social Needs    Financial resource strain: Not on file    Food insecurity     Worry: Not on file     Inability: Not on file    Transportation needs     Medical: Not on file     Non-medical: Not on file   Tobacco Use    Smoking status: Never Smoker    Smokeless tobacco: Never Used   Substance and Sexual Activity    Alcohol use: No    Drug use: No    Sexual activity: Yes     Partners: Female   Lifestyle    Physical activity     Days per week: Not on file     Minutes per session: Not on file    Stress: Not on file   Relationships    Social connections     Talks on phone: Not on file     Gets together: Not on file     Attends Mandaen service: Not on file     Active member of club or organization: Not on file     Attends meetings of clubs or organizations: Not on file Relationship status: Not on file    Intimate partner violence     Fear of current or ex partner: Not on file     Emotionally abused: Not on file     Physically abused: Not on file     Forced sexual activity: Not on file   Other Topics Concern    Not on file   Social History Narrative    Not on file       ROS: Positive in bold  General:   Denies chills, fatigue, fever, malaise, night sweats or weight loss    Psychological:   Denies anxiety, disorientation or hallucinations    ENT:    Denies epistaxis, headaches, vertigo or visual changes    Cardiovascular:   Denies any chest pain, irregular heartbeats, or palpitations. No paroxysmal nocturnal dyspnea. Respiratory:   Denies shortness of breath, coughing, sputum production, hemoptysis, or wheezing. No orthopnea. Gastrointestinal:   Denies nausea, vomiting, diarrhea, or constipation. Denies any abdominal pain. Denies change in bowel habits or stools. Genito-Urinary:    Denies any urgency, frequency, hematuria. Voiding without difficulty. Musculoskeletal:   Denies joint pain, joint stiffness, joint swelling or muscle pain    Neurology:    Denies any headache or focal neurological deficits. No weakness or paresthesia. Derm:    Denies any rashes, ulcers, or excoriations. Denies bruising. Extremities:   Denies any lower extremity swelling or edema. PHYSICAL EXAM:  VITALS:  Vitals:    01/26/21 0800   BP:    Pulse: 82   Resp:    Temp:    SpO2: 93%         CONSTITUTIONAL:    Awake, alert, cooperative, no apparent distress, and appears stated age    EYES:    PERRL, EOMI, sclera clear, conjunctiva normal    ENT:    Normocephalic, atraumatic, sinuses nontender on palpation. External ears without lesions. Oral pharynx with moist mucus membranes. Tonsils without erythema or exudates.     NECK:    Supple, symmetrical, trachea midline, no adenopathy, thyroid symmetric, not enlarged and no tenderness, skin normal, no bruits, no JVD    HEMATOLOGIC/LYMPHATICS:    No cervical lymphadenopathy and no supraclavicular lymphadenopathy    LUNGS:    Symmetric. No increased work of breathing, good air exchange, clear to auscultation bilaterally, no wheezes, rhonchi, or rales,     CARDIOVASCULAR:    Normal apical impulse, regular rate and rhythm, normal S1 and S2, no S3 or S4, and no murmur noted    ABDOMEN:    + Obese. No scars, normal bowel sounds, soft, non-distended, non-tender, no masses palpated, no hepatosplenomegaly, no rebound or guarding elicited on palpation. Patient does have suprapubic tenderness. MUSCULOSKELETAL:    There is no redness, warmth, or swelling of the joints. Full range of motion noted. Motor strength is 5 out of 5 all extremities bilaterally. Tone is normal.    NEUROLOGIC:    Awake, alert, oriented to name, place and time. Cranial nerves II-XII are grossly intact. Motor is 5 out of 5 bilaterally. SKIN:    Marked erythema and induration noted in suprapubic area right greater than left    EXTREMITIES:    Peripheral pulses present. No edema, cyanosis, or swelling.     LABORATORY DATA:  CBC with Differential:    Lab Results   Component Value Date    WBC 11.2 01/25/2021    RBC 3.98 01/25/2021    HGB 12.3 01/25/2021    HCT 37.3 01/25/2021     01/25/2021    MCV 93.7 01/25/2021    MCH 30.9 01/25/2021    MCHC 33.0 01/25/2021    RDW 13.1 01/25/2021    LYMPHOPCT 23.8 01/25/2021    MONOPCT 8.7 01/25/2021    BASOPCT 0.4 01/25/2021    MONOSABS 0.97 01/25/2021    LYMPHSABS 2.66 01/25/2021    EOSABS 0.19 01/25/2021    BASOSABS 0.04 01/25/2021     CMP:    Lab Results   Component Value Date     01/25/2021    K 3.6 01/25/2021    K 4.0 01/23/2021     01/25/2021    CO2 24 01/25/2021    BUN 9 01/25/2021    CREATININE 1.0 01/25/2021    GFRAA >60 01/25/2021    LABGLOM >60 01/25/2021    GLUCOSE 200 01/25/2021    PROT 7.1 01/25/2021    LABALBU 3.7 01/25/2021    CALCIUM 8.3 01/25/2021    BILITOT 0.5 01/25/2021 ALKPHOS 64 01/25/2021    AST 13 01/25/2021    ALT 21 01/25/2021       ASSESSMENT:  1. Cellulitis/small abscess of suprapubic area and right lower quadrant with abscess-MRSA  2. Ileus and cystitis per CT scan  3. Non insulin dependent diabetes mellitus-hemoglobin A1c is 10  4. Asthma  5. Hypertension-uncontrolled   6. Obesity  7.  Hyperlipidemia-uncontrolled with LDL of 183, triglycerides 149, HDL 45    Plan:  Discharge home today  Prescription for Bactrim DS 1 twice daily for 12-day  Prescription for doxycycline 100 mg twice daily for 10 days  Prescription for Metformin 1 g p.o. twice daily  Prescription for Toprol-XL 50 mg p.o. daily  Norco 5 mg-1 every 6 hours as needed-#16-no refills    Increase Prinivil 20 mg-2 daily  Increase hydrochlorothiazide 12.5 mg-2 daily    Continue other home medication but stopped diltiazem    Follow-up primary care physician in 1 week  I will fax a copy of the discharge summary to primary care physician's office    Deandre Dominguez D.O.  9:34 AM  1/26/2021

## 2021-01-29 LAB
BLOOD CULTURE, ROUTINE: NORMAL
CULTURE, BLOOD 2: NORMAL

## 2021-02-03 ENCOUNTER — OFFICE VISIT (OUTPATIENT)
Dept: SURGERY | Age: 56
End: 2021-02-03
Payer: COMMERCIAL

## 2021-02-03 VITALS
RESPIRATION RATE: 18 BRPM | WEIGHT: 247 LBS | SYSTOLIC BLOOD PRESSURE: 158 MMHG | BODY MASS INDEX: 36.58 KG/M2 | HEIGHT: 69 IN | HEART RATE: 60 BPM | DIASTOLIC BLOOD PRESSURE: 86 MMHG | TEMPERATURE: 97.1 F

## 2021-02-03 DIAGNOSIS — L02.219 SUPRAPUBIC ABSCESS: Primary | ICD-10-CM

## 2021-02-03 PROCEDURE — 99212 OFFICE O/P EST SF 10 MIN: CPT | Performed by: SURGERY

## 2021-02-03 RX ORDER — MUPIROCIN CALCIUM 20 MG/G
CREAM TOPICAL
Qty: 15 G | Refills: 0 | Status: SHIPPED | OUTPATIENT
Start: 2021-02-03 | End: 2021-03-05

## 2021-02-03 RX ORDER — SULFAMETHOXAZOLE AND TRIMETHOPRIM 800; 160 MG/1; MG/1
1 TABLET ORAL 2 TIMES DAILY
Qty: 10 TABLET | Refills: 0 | Status: SHIPPED | OUTPATIENT
Start: 2021-02-03 | End: 2021-02-08

## 2021-02-03 NOTE — PROGRESS NOTES
General Surgery Office Note  Charly Poe MD, MS    Patient's Name/Date of Birth: Willene Bence / 1965    Date: February 3, 2021     Surgeon: Shae Glover MD    Chief Complaint: Suprapubic abscess, diabetes    Patient Active Problem List   Diagnosis    Abdominal pain    Panniculitis?  Colitis?  Near syncope    Cellulitis       Subjective: Patient is having significant improvement in his pain he states still some intermittent drainage at times denies any fevers. Has been tolerating the antibiotics completed his doxycycline is still taking Bactrim    Objective:  BP (!) 158/86   Pulse 60   Temp 97.1 °F (36.2 °C) (Temporal)   Resp 18   Ht 5' 9\" (1.753 m)   Wt 247 lb (112 kg)   BMI 36.48 kg/m²   Labs:  No results for input(s): WBC, HGB, HCT in the last 72 hours. Invalid input(s): PLR  Lab Results   Component Value Date    CREATININE 1.0 01/25/2021    BUN 9 01/25/2021     01/25/2021    K 3.6 01/25/2021     01/25/2021    CO2 24 01/25/2021     No results for input(s): LIPASE, AMYLASE in the last 72 hours. General appearance: AA, NAD  HEENT: NCAT, PERRLA, EOMI  Lungs: Clear, equal rise bilateral  Heart: Reg  Abdomen: soft, nondistended, nontender, incisions healing but open no purulence minimal induration underneath it resolved cellulitis  Skin: No lesions, incision open suprapubic  Psych: No distress, conversive, no hallucinations  : No ulcers or lesions  Rectal: No bleeding    A complete 10 system review was performed and are otherwise negative unless mentioned in the above HPI. Specific negatives are listed below but may not include all those reviewed.     General ROS: negative obtundation, AMS  ENT ROS: negative rhinorrhea, epistaxis  Allergy and Immunology ROS: negative itchy/watery eyes or nasal congestion  Hematological and Lymphatic ROS: negative spontaneous bleeding or bruising  Endocrine ROS: negative  lethargy, mood swings, palpitations or polydipsia/polyuria  Respiratory ROS: negative sputum changes, stridor, tachypnea or wheezing  Cardiovascular ROS: negative for - loss of consciousness, murmur or orthopnea  Gastrointestinal ROS: negative for - hematochezia or hematemesis  Genito-Urinary ROS: negative for -  genital discharge or hematuria  Musculoskeletal ROS: negative for - focal weakness, gangrene  Psych/Neuro ROS: negative for - visual or auditory hallucinations, suicidal ideation      Time spent reviewing past medical, surgical, social and family history, vitals, nursing assessment and images.       Assessment/Plan:  Bushra Carrillo is a 48 y.o. male suprapubic abscess    We will add topical Bactroban  Extend his Bactrim doses for another 5 days  Blood sugar control  I do not see any further need for surgical intervention or incision and drainage  Recommend continued soap and water cleansing twice daily  Agree with Dr. Aguila Greenberg return to full active duty at work in 5 days  Follow-up with me if worsening symptoms or signs of worsening suprapubic pain or drainage    Physician Signature: Electronically signed by Dr. Cynthia Figueroa  [phone] (p)  2/3/2021  12:00 PM

## 2021-02-05 DIAGNOSIS — L02.219 SUPRAPUBIC ABSCESS: Primary | ICD-10-CM

## 2021-02-18 NOTE — PROGRESS NOTES
Physician Progress Note      Carlos Sotelo  CSN #:                  [id number]  :                       1965  ADMIT DATE:       2021 7:33 PM  [address] [place] DATE:        2021 1:33 PM  RESPONDING  PROVIDER #:        Elroy Peabody MD          QUERY TEXT:    Patient admitted with suprapubic abscess. Per Op note dated /documentation   of I&D. To accurately reflect the procedure performed please further specify the depth   of tissue incised and drained: The medical record reflects the following:  Risk Factors: per documentation diagnosis of suprapubic abscess  Clinical Indicators: per Op note   An incision was then made over the center   of the lesion and approximately 3 cc of purulent and bloody material was   expressed. Loculations were broken up using forceps but no more material was   returned. The abscess cavity was superficial and did not extend to the deep   space  Treatment: I and D    Thank you  Katharina Duane RN CCDS  [phone]  Options provided:  -- Skin only  -- Subcutaneous tissue  -- Fascia  -- Other - I will add my own diagnosis  -- Disagree - Not applicable / Not valid  -- Disagree - Clinically unable to determine / Unknown  -- Refer to Clinical Documentation Reviewer    PROVIDER RESPONSE TEXT:    The depth of the drainage was down to and including subcutaneous tissue.     Query created by: Matt Foster on 2021 11:21 AM      Electronically signed by:  Elroy Peabody MD 2021 6:09 PM

## 2022-05-25 DIAGNOSIS — G56.82 OTHER SPECIFIED MONONEUROPATHIES OF LEFT UPPER LIMB: Primary | ICD-10-CM

## 2022-05-31 ENCOUNTER — OFFICE VISIT (OUTPATIENT)
Dept: PHYSICAL MEDICINE AND REHAB | Age: 57
End: 2022-05-31
Payer: COMMERCIAL

## 2022-05-31 VITALS — BODY MASS INDEX: 37.13 KG/M2 | WEIGHT: 245 LBS | HEIGHT: 68 IN

## 2022-05-31 DIAGNOSIS — G56.22 ULNAR NEUROPATHY AT ELBOW OF LEFT UPPER EXTREMITY: Primary | ICD-10-CM

## 2022-05-31 PROCEDURE — G8428 CUR MEDS NOT DOCUMENT: HCPCS | Performed by: PHYSICAL MEDICINE & REHABILITATION

## 2022-05-31 PROCEDURE — 95913 NRV CNDJ TEST 13/> STUDIES: CPT | Performed by: PHYSICAL MEDICINE & REHABILITATION

## 2022-05-31 PROCEDURE — 99243 OFF/OP CNSLTJ NEW/EST LOW 30: CPT | Performed by: PHYSICAL MEDICINE & REHABILITATION

## 2022-05-31 PROCEDURE — 95886 MUSC TEST DONE W/N TEST COMP: CPT | Performed by: PHYSICAL MEDICINE & REHABILITATION

## 2022-05-31 PROCEDURE — G8417 CALC BMI ABV UP PARAM F/U: HCPCS | Performed by: PHYSICAL MEDICINE & REHABILITATION

## 2022-05-31 NOTE — PATIENT INSTRUCTIONS
Electrodiagnotic Laboratory  Accredited by the [facility] with Exemplary status  ERICH Fowler D.O. [facility]  [address] [place]  Phone: [phone]  Fax: [phone]        Today you had an electrodiagnostic exam which included nerve conduction studies (NCS) and electromyography (EMG). This test evaluated the electrical activity of your nerves and muscles to help determine if you have a nerve or muscle disease. This test can help determine the location and type of a nerve or muscle problem. This will help your referring doctor diagnose your condition and determine the appropriate next step in your treatment plan. After your test:    1. There are no long lasting side effects of the test.     2. You may resume your normal activities without restrictions. 3.  Resume any medications that were stopped for the test.     4  If you have sore areas or bruising in your muscles where the needle was placed, apply a cold pack to the sore area for 15-20 minutes three to four times a day as needed for pain. The soreness should go away in about 1-2 days. 5. Your results were provided  Briefly at the end of your test and the final detailed report will be provided to your referring physician, and/or primary care physician and any other parties you requested within 1-2 days of the examination. You may wish to contact your referring provider after a few days to determine what they would like you to do next. 6.  Please call [phone] with any questions or concerns and if you develop increased body temperature/fever, swelling, tenderness, increased pain and/or drainage from the sites where the needle was placed. Thank you for choosing us for your health care needs.

## 2022-05-31 NOTE — PROGRESS NOTES
[facility]  Electrodiagnostic Laboratory  *Accredited by the [address] [place] [address] with exemplary status  [address] [facility] [place]  Phone: [phone]  Fax: [phone]    Referring Provider: Lucila Armijo DO  Primary Care Physician: Sindhu Jara DO  Patient Name: Harry Pitts  Patient YOB: 1965  Gender: male  BMI: Body mass index is 37.25 kg/m². Height 5' 8\" (1.727 m), weight 245 lb (111.1 kg). 6/1/2022    Reason for Referral: Mononeuropathy    Description of clinical problem:   Chief Complaint   Patient presents with    Extremity Pain     none    Numbness     entire left hand numbness. numbness in left side of face, lip area. 2 weeks of symp. no acute injury.  Extremity Weakness     none     Sensory NCS      Nerve / Sites Rec. Site Peak Lat PP Amp Segments Distance Velocity Temp. ms µV  cm m/s °C   L Median - Digit II (Antidromic)      Palm Dig II 1.88 49.2 Palm - Dig II 7 64 34      Wrist Dig II 3.13 38.9 Wrist - Dig II 14 61 34   L Ulnar - Digit V (Antidromic)      Wrist Dig V 3.23 13.0 Wrist - Dig V 14 54 34.1   L Radial - Anatomical snuff box (Forearm)      Forearm Wrist 2.24 17.4 Forearm - Wrist 10 58 34.1   L Dorsal ulnar cutaneous - Hand dorsum (Forearm)      Forearm Hand dorsum 2.24 13.3 Forearm - Hand dorsum 8 48 34       Combined Sensory Index      Nerve / Sites Rec. Site Peak Lat NP Amp PP Amp Segments Dist. Peak Diff Temp.      ms µV µV  cm ms °C   L Median - CSI      Median Thumb 2.55 13.3 19.3 Median - Radial 10 0.16 34      Radial Thumb 2.40 7.8 21.3 Median - Ulnar 14 -0.10 34      Median Ring 3.18 10.5 36.1 Median palm - Ulnar palm 8 -0.10 34      Ulnar Ring 3.28 0.81 8.3          Median palm Wrist 1.77 82.3 23.4          Ulnar palm Wrist 1.88 19.9 6.9          CSI     CSI  0.05        Motor NCS      Nerve / Sites Muscle Onset Amplitude Segments Distance Velocity Temp.     ms mV  cm m/s °C   L Median - APB      Palm APB 1.46 9.0 Palm - APB   34      Wrist APB 3.02 5.3 Wrist - Palm 8 51 34.1      Elbow APB 6.56 4.9 Elbow - Wrist 19 54 34.1   L Ulnar - ADM      Wrist ADM 3.07 11.4 Wrist - ADM 8  34.1      B. Elbow ADM 6.72 11.5 B. Elbow - Wrist 21 58 34.1      A. Elbow ADM 9.22 11.0 A. Elbow - B. Elbow 10 40* 34.1       F Wave      Nerve Fmin % F    ms %   L Median - APB 30.10 7.14   L Ulnar - ADM 29.48 50       EMG      EMG Summary Table     Spontaneous MUAP Recruitment   Muscle Nerve Roots IA Fib PSW Fasc Amp Dur. PPP Pattern   L. Biceps brachii Musculocutaneous C5-C6 N None None None N N N N   L. Triceps brachii Radial C6-C8 N None None None N N N N   L. Pronator teres Median C6-C7 N None None None N N N N   L. First dorsal interosseous Ulnar C8-T1 N None None None N N N N   L. Abductor pollicis brevis Median L8-V5 N None None None N N N N   L. Flexor digitorum profundus, dig 4 & 5 Ulnar C8-T1 N 1+ 1+ None N N N Reduced          Study Limitations:  none    Summary of Findings:   Nerve conduction studies:   · The following nerve conduction studies were abnormal:   · The left ulnar motor study recording the Abductor digiti minimi was focally slow in conduction velocity across the elbow. · All other nerve conduction studies, as listed in the table were normal in latency, amplitude and conduction velocity. Needle EMG:   · Needle EMG was performed using a concentric needle. · The following abnormalities were seen on needle EMG: positive sharp waves and fibrillation potentials with decreased motor unit recruitment in the left flexor digitorum profundus to the fourth digit  ·   · Otherwise, observed motor units were normal in amplitude, duration, phases and recruitment and no active denervation signs were seen. Diagnostic Interpretation: This study was abnormal.     Electrodiagnosis: There is electrodiagnostic evidence of an ulnar neuropathy. · Location: left at the elbow.    · Nature: [  ] Axonal   [  ] Demyelinating  [ X ] Mixed axonal and demyelinating     [  ] Sensory [  ] Motor               Teresa.Cristofer  ] Mixed sensorimotor     Teresa.Cristofer  ] with active denervation       [  ] without active denervation  · Duration: Subacute  · Severity: severe  · Prognosis: Fair. The prognosis for recovery of axonal lesions is poor and dependant on collateral sprouting and reinnervation. The prognosis for recovery of demyelinating lesions is good if the cause is alleviated. Previous Study: There is not a prior study for comparison. Follow up EMG is recommended if clinically warranted. Technologist: Arturo Alvarez  Physician:    Corbin Becerra D.O., P.T. Board Certified Physical Medicine and Rehabilitation  Board Certified Electrodiagnostic Medicine      Nerve conduction studies and electromyography were performed according to our laboratory policies and procedures which can be provided upon request. All abnormal values are identified in the table.  Laboratory normal values can also be provided upon request.       Cc: Ruthann Javier., DO Yakelin Todd, [place]

## 2022-06-01 DIAGNOSIS — G56.82 OTHER SPECIFIED MONONEUROPATHIES OF LEFT UPPER LIMB: ICD-10-CM

## 2022-06-01 NOTE — PROGRESS NOTES
[address] [facility]  Electrodiagnostic Laboratory  *Accredited by the [address] [place] [address] with exemplary status  [address] [facility] [address]  Phone: [phone]  Fax: [phone]      Date of Examination: 06/01/22  Patient Name: Raul Brown  is a 55y.o. year old male who was seen today regarding   Chief Complaint   Patient presents with    Extremity Pain     none    Numbness     entire left hand numbness. numbness in left side of face, lip area. 2 weeks of symp. no acute injury.  Extremity Weakness     none   . The symptoms started after no injury. The symptoms are intermittent. Previous workup has included: none. Past Medical History:   Diagnosis Date    Asthma     Diabetes mellitus ([facility] [address]     H/O cardiovascular stress test 12/28/2016    lexiscan    Hypertension     Insomnia     Shoulder pain        No past surgical history on file. There is not family history of neuromuscular conditions. ROS: There has been no associated vision change, hearing change, speech abnormality, swallowing abnormality, or bowel or bladder dysfunction. Physical Exam: General: The patient is in no apparent distress. Height 5' 8\" (1.727 m), weight 245 lb (111.1 kg). MSK: There is no joint effusion, deformity, instability, swelling, erythema or warmth. AROM is full in the spine and extremities. +Tinel left elbow. Neurologic:  No focal sensorimotor deficit. Reflexes 2+ and symmetric. Gait is normal.    Impression:     1. Ulnar neuropathy at elbow of left upper extremity        Plan:   · EMG is indicated to evaluate the above diagnosis. Orders Placed This Encounter   Procedures    MN NEEDLE EMG EA EXTREMTY W/PARASPINL AREA COMPLETE    MN MOTOR &/SENS 11-12 NRV CNDJ PRECONF ELTRODE LIMB     · EMG was done today and showed left ulnar neuropathy at the elbow. The patient was educated about the diagnosis and the prognosis.    Patient educated to avoid leaning on elbow and to avoid repetitive elbow flexion/extension. · Elbow splint at h.s.    · Consider surgical consultation given axonal loss. · Advised patient to follow up with referring provider. Thank you for allowing me to participate in the care of your patient.       Sincerely,     Bella Leung, DO

## 2022-07-20 ENCOUNTER — OFFICE VISIT (OUTPATIENT)
Dept: PAIN MANAGEMENT | Age: 57
End: 2022-07-20
Payer: COMMERCIAL

## 2022-07-20 VITALS
HEIGHT: 69 IN | RESPIRATION RATE: 16 BRPM | TEMPERATURE: 97.3 F | BODY MASS INDEX: 36.29 KG/M2 | DIASTOLIC BLOOD PRESSURE: 98 MMHG | OXYGEN SATURATION: 97 % | HEART RATE: 104 BPM | WEIGHT: 245 LBS | SYSTOLIC BLOOD PRESSURE: 160 MMHG

## 2022-07-20 DIAGNOSIS — M47.817 LUMBOSACRAL SPONDYLOSIS WITHOUT MYELOPATHY: ICD-10-CM

## 2022-07-20 DIAGNOSIS — M51.36 DDD (DEGENERATIVE DISC DISEASE), LUMBAR: Primary | ICD-10-CM

## 2022-07-20 PROCEDURE — G8417 CALC BMI ABV UP PARAM F/U: HCPCS | Performed by: ANESTHESIOLOGY

## 2022-07-20 PROCEDURE — 99203 OFFICE O/P NEW LOW 30 MIN: CPT | Performed by: ANESTHESIOLOGY

## 2022-07-20 PROCEDURE — 99204 OFFICE O/P NEW MOD 45 MIN: CPT | Performed by: ANESTHESIOLOGY

## 2022-07-20 PROCEDURE — 3017F COLORECTAL CA SCREEN DOC REV: CPT | Performed by: ANESTHESIOLOGY

## 2022-07-20 PROCEDURE — G8427 DOCREV CUR MEDS BY ELIG CLIN: HCPCS | Performed by: ANESTHESIOLOGY

## 2022-07-20 PROCEDURE — 1036F TOBACCO NON-USER: CPT | Performed by: ANESTHESIOLOGY

## 2022-07-20 RX ORDER — METHOCARBAMOL 500 MG/1
TABLET, FILM COATED ORAL
COMMUNITY
Start: 2022-05-21 | End: 2022-07-20

## 2022-07-20 RX ORDER — HYDROXYZINE 50 MG/1
TABLET, FILM COATED ORAL
COMMUNITY
Start: 2022-07-19

## 2022-07-20 RX ORDER — LOSARTAN POTASSIUM 100 MG/1
TABLET ORAL
COMMUNITY
Start: 2022-04-15

## 2022-07-20 RX ORDER — ERGOCALCIFEROL 1.25 MG/1
CAPSULE ORAL
COMMUNITY
Start: 2022-07-17

## 2022-07-20 RX ORDER — TIZANIDINE 4 MG/1
TABLET ORAL
COMMUNITY
Start: 2022-06-14

## 2022-07-20 RX ORDER — TRAZODONE HYDROCHLORIDE 100 MG/1
TABLET ORAL
COMMUNITY
Start: 2022-07-09
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
## 2023-05-25 ENCOUNTER — HOSPITAL ENCOUNTER (EMERGENCY)
Age: 58
Discharge: HOME | End: 2023-05-25
Payer: COMMERCIAL

## 2023-05-25 VITALS
RESPIRATION RATE: 20 BRPM | HEART RATE: 58 BPM | SYSTOLIC BLOOD PRESSURE: 196 MMHG | OXYGEN SATURATION: 98 % | TEMPERATURE: 97.8 F | DIASTOLIC BLOOD PRESSURE: 101 MMHG

## 2023-05-25 DIAGNOSIS — R03.0 ELEVATED BLOOD PRESSURE READING: Primary | ICD-10-CM

## 2023-05-25 PROCEDURE — 96374 THER/PROPH/DIAG INJ IV PUSH: CPT

## 2023-05-25 PROCEDURE — 2580000003 HC RX 258: Performed by: PHYSICIAN ASSISTANT

## 2023-05-25 PROCEDURE — 96376 TX/PRO/DX INJ SAME DRUG ADON: CPT

## 2023-05-25 PROCEDURE — 2500000003 HC RX 250 WO HCPCS: Performed by: PHYSICIAN ASSISTANT

## 2023-05-25 PROCEDURE — 99284 EMERGENCY DEPT VISIT MOD MDM: CPT

## 2023-05-25 PROCEDURE — 6370000000 HC RX 637 (ALT 250 FOR IP): Performed by: PHYSICIAN ASSISTANT

## 2023-05-25 RX ORDER — 0.9 % SODIUM CHLORIDE 0.9 %
1000 INTRAVENOUS SOLUTION INTRAVENOUS ONCE
Status: COMPLETED | OUTPATIENT
Start: 2023-05-25 | End: 2023-05-25

## 2023-05-25 RX ORDER — CLONIDINE HYDROCHLORIDE 0.2 MG/1
0.2 TABLET ORAL ONCE
Status: COMPLETED | OUTPATIENT
Start: 2023-05-25 | End: 2023-05-25

## 2023-05-25 RX ORDER — HYDROCHLOROTHIAZIDE 12.5 MG/1
12.5 CAPSULE, GELATIN COATED ORAL EVERY MORNING
Qty: 30 CAPSULE | Refills: 0 | Status: SHIPPED | OUTPATIENT
Start: 2023-05-25

## 2023-05-25 RX ORDER — METOPROLOL TARTRATE 5 MG/5ML
5 INJECTION INTRAVENOUS ONCE
Status: COMPLETED | OUTPATIENT
Start: 2023-05-25 | End: 2023-05-25

## 2023-05-25 RX ORDER — ERYTHROMYCIN 5 MG/G
OINTMENT OPHTHALMIC
Qty: 3.5 G | Refills: 0 | Status: SHIPPED | OUTPATIENT
Start: 2023-05-25 | End: 2023-06-04

## 2023-05-25 RX ORDER — METOPROLOL SUCCINATE 50 MG/1
50 TABLET, EXTENDED RELEASE ORAL DAILY
Qty: 30 TABLET | Refills: 3 | Status: SHIPPED | OUTPATIENT
Start: 2023-05-25

## 2023-05-25 RX ORDER — MOXIFLOXACIN 5 MG/ML
SOLUTION/ DROPS OPHTHALMIC
Qty: 1 EACH | Refills: 0 | Status: SHIPPED | OUTPATIENT
Start: 2023-05-25

## 2023-05-25 RX ADMIN — METOPROLOL TARTRATE 5 MG: 5 INJECTION INTRAVENOUS at 15:25

## 2023-05-25 RX ADMIN — CLONIDINE HYDROCHLORIDE 0.2 MG: 0.2 TABLET ORAL at 12:03

## 2023-05-25 RX ADMIN — SODIUM CHLORIDE 1000 ML: 9 INJECTION, SOLUTION INTRAVENOUS at 15:24

## 2023-05-25 RX ADMIN — METOPROLOL TARTRATE 5 MG: 5 INJECTION, SOLUTION INTRAVENOUS at 17:32

## 2023-05-25 ASSESSMENT — PAIN - FUNCTIONAL ASSESSMENT: PAIN_FUNCTIONAL_ASSESSMENT: NONE - DENIES PAIN
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
## 2024-02-06 ENCOUNTER — HOSPITAL ENCOUNTER (EMERGENCY)
Age: 59
Discharge: HOME | End: 2024-02-06
Attending: EMERGENCY MEDICINE
Payer: COMMERCIAL

## 2024-02-06 VITALS
DIASTOLIC BLOOD PRESSURE: 92 MMHG | SYSTOLIC BLOOD PRESSURE: 180 MMHG | HEIGHT: 68 IN | RESPIRATION RATE: 16 BRPM | HEART RATE: 77 BPM | WEIGHT: 235 LBS | BODY MASS INDEX: 35.61 KG/M2 | TEMPERATURE: 98 F | OXYGEN SATURATION: 99 %

## 2024-02-06 DIAGNOSIS — N34.2 URETHRITIS: Primary | ICD-10-CM

## 2024-02-06 DIAGNOSIS — Z71.1 CONCERN ABOUT STD IN MALE WITHOUT DIAGNOSIS: ICD-10-CM

## 2024-02-06 LAB
BILIRUB UR QL STRIP: NEGATIVE
CLARITY UR: ABNORMAL
COLOR UR: YELLOW
EPI CELLS #/AREA URNS HPF: ABNORMAL /HPF
GLUCOSE UR STRIP-MCNC: >=1000 MG/DL
HGB UR QL STRIP.AUTO: ABNORMAL
KETONES UR STRIP-MCNC: NEGATIVE MG/DL
LEUKOCYTE ESTERASE UR QL STRIP: ABNORMAL
NITRITE UR QL STRIP: NEGATIVE
PH UR STRIP: 5.5 [PH] (ref 5–9)
PROT UR STRIP-MCNC: 30 MG/DL
RBC #/AREA URNS HPF: ABNORMAL /HPF
SP GR UR STRIP: 1.02 (ref 1–1.03)
UROBILINOGEN UR STRIP-ACNC: 0.2 EU/DL (ref 0–1)
WBC #/AREA URNS HPF: ABNORMAL /HPF

## 2024-02-06 PROCEDURE — 96372 THER/PROPH/DIAG INJ SC/IM: CPT

## 2024-02-06 PROCEDURE — 6360000002 HC RX W HCPCS: Performed by: EMERGENCY MEDICINE

## 2024-02-06 PROCEDURE — 81001 URINALYSIS AUTO W/SCOPE: CPT

## 2024-02-06 PROCEDURE — 2500000003 HC RX 250 WO HCPCS

## 2024-02-06 PROCEDURE — 99284 EMERGENCY DEPT VISIT MOD MDM: CPT

## 2024-02-06 PROCEDURE — 87491 CHLMYD TRACH DNA AMP PROBE: CPT

## 2024-02-06 PROCEDURE — 87591 N.GONORRHOEAE DNA AMP PROB: CPT

## 2024-02-06 RX ORDER — DOXYCYCLINE HYCLATE 100 MG
100 TABLET ORAL 2 TIMES DAILY
Qty: 14 TABLET | Refills: 0 | Status: SHIPPED | OUTPATIENT
Start: 2024-02-06 | End: 2024-02-13

## 2024-02-06 RX ORDER — LIDOCAINE HYDROCHLORIDE 10 MG/ML
INJECTION, SOLUTION INFILTRATION; PERINEURAL
Status: COMPLETED
Start: 2024-02-06 | End: 2024-02-06

## 2024-02-06 RX ORDER — CEFTRIAXONE 500 MG/1
500 INJECTION, POWDER, FOR SOLUTION INTRAMUSCULAR; INTRAVENOUS ONCE
Status: COMPLETED | OUTPATIENT
Start: 2024-02-06 | End: 2024-02-06

## 2024-02-06 RX ADMIN — CEFTRIAXONE SODIUM 500 MG: 500 INJECTION, POWDER, FOR SOLUTION INTRAMUSCULAR; INTRAVENOUS at 18:10

## 2024-02-06 RX ADMIN — LIDOCAINE HYDROCHLORIDE 100 MG: 10 INJECTION, SOLUTION INFILTRATION; PERINEURAL at 18:12

## 2024-02-06 ASSESSMENT — PAIN - FUNCTIONAL ASSESSMENT: PAIN_FUNCTIONAL_ASSESSMENT: NONE - DENIES PAIN

## 2024-02-06 NOTE — ED PROVIDER NOTES
HPI:  2/6/24,   Time: 5:51 PM FRANCE Selby is a 58 y.o. male presenting to the ED for complaint: Patient complains of burning urination and discharge, beginning 3 days ago.  The complaint has been persistent, mild in severity, and worsened by nothing.  Patient had unprotected sex 1 week ago and expresses concern for STD.      ROS:   Pertinent positives and negatives are stated within HPI, all other systems reviewed and are negative.  --------------------------------------------- PAST HISTORY ---------------------------------------------  Past Medical History:  has a past medical history of Asthma, Diabetes mellitus (HCC), H/O cardiovascular stress test, Hypertension, Insomnia, and Shoulder pain.    Past Surgical History:  has no past surgical history on file.    Social History:  reports that he has never smoked. He has never used smokeless tobacco. He reports that he does not drink alcohol and does not use drugs.    Family History: family history includes Diabetes in his mother.     The patient’s home medications have been reviewed.    Allergies: Patient has no known allergies.    -------------------------------------------------- RESULTS -------------------------------------------------  All laboratory and radiology results have been personally reviewed by myself   LABS:  Results for orders placed or performed during the hospital encounter of 02/06/24   Urinalysis   Result Value Ref Range    Color, UA Yellow Yellow    Turbidity UA SLIGHTLY CLOUDY (A) Clear    Glucose, Ur >=1000 (A) NEGATIVE mg/dL    Bilirubin Urine NEGATIVE NEGATIVE    Ketones, Urine NEGATIVE NEGATIVE mg/dL    Specific Gravity, UA 1.020 1.005 - 1.030    Urine Hgb SMALL (A) NEGATIVE    pH, UA 5.5 5.0 - 9.0    Protein, UA 30 (A) NEGATIVE mg/dL    Urobilinogen, Urine 0.2 0.0 - 1.0 EU/dL    Nitrite, Urine NEGATIVE NEGATIVE    Leukocyte Esterase, Urine MODERATE (A) NEGATIVE   Microscopic Urinalysis   Result Value Ref Range    WBC, UA 51 TO

## 2024-02-09 LAB
CHLAMYDIA DNA UR QL NAA+PROBE: NEGATIVE
N GONORRHOEA DNA UR QL NAA+PROBE: ABNORMAL
SPECIMEN DESCRIPTION: ABNORMAL

## 2024-02-09 NOTE — ED NOTES
Reviewed patients after hours culture results.   Culture growing NEISSERIA GONORRHOEAE, patient treated with ceftriaxone prior to discharge.  I attempted to notify patient of results - no answer, left VM to call back to review results.      Elena Levy PharmD, BCPS 2/9/2024 3:27 PM   [phone]      Addendum:    Patient returned call - notified him of results & proper precautions.    Elena Levy PharmD, BCPS 2/9/2024 3:48 PM   [phone]

## 2025-03-07 NOTE — H&P
Department of Internal Medicine  History and Physical    PCP: Dr. Tere Olvera. Admitting Physician: Dr. Ludmila Barton  Consultants:       CHIEF COMPLAINT: Right lower quadrant and suprapubic swelling and pain    HISTORY OF PRESENT ILLNESS:    Patient is a 72-year-old male who presented to the ED due to right lower quadrant and suprapubic abdominal pain and swelling. States it started about a week ago. From the start to about 2 to 3 days later he developed increased swelling in the area along with pain. This continued and as such she presented today for further evaluation and treatment. States that his belt buckle rubbing up against his abdomen could have caused this. However denies any other trauma prior to developing this issue. Denies fever or chills. This has never occurred before. The emergency room stuck a needle in the thing with a small amount of pus coming out. [facility] in the ED initially 11.3. Other routine lab was unremarkable except for random blood sugar 219. This morning WBC 11.8 with hemoglobin A1c of 10. Fasting blood sugar was 204. Temperature 98 degrees with a heart rate of 82 and blood pressure currently 188/98 with O2 sat 98% on room air. Physical exam showed suprapubic area to have some firmness along with edema and tenderness on palpation. Patient denies any problem with chest pain, nausea/vomiting, shortness of breath or dizziness. PAST MEDICAL Hx:  Past Medical History:   Diagnosis Date    Asthma     Diabetes mellitus ([address]     H/O cardiovascular stress test 12/28/2016    lexiscan    Hypertension     Insomnia     Shoulder pain        PAST SURGICAL Hx:   History reviewed. No pertinent surgical history. FAMILY Hx:  Family History   Problem Relation Age of Onset    Diabetes Mother        HOME MEDICATIONS:  Prior to Admission medications    Medication Sig Start Date End Date Taking?  Authorizing Provider   lisinopril (PRINIVIL;ZESTRIL) 20 MG tablet Take 1 tablet by mouth daily Informed patient of DEE Winter's note. Patient verbalized understanding. Patient is adamant that he would still like his PSA rechecked, PA to advise further if appointment is needed to discuss or if PSA should be ordered.     12/28/16  Yes Neeru Davis MD   atorvastatin (LIPITOR) 20 MG tablet Take 1 tablet by mouth daily 12/28/16  Yes Neeru Davis MD   diltiazem (CARTIA XT) 240 MG ER capsule Take 1 capsule by mouth daily 7/18/16  Yes Ana Posey,    hydrochlorothiazide (MICROZIDE) 12.5 MG capsule Take 12.5 mg by mouth every morning   Yes Historical Provider, MD   sitaGLIPtin (JANUVIA) 100 MG tablet Take 100 mg by mouth daily   Yes Historical Provider, MD   mometasone-formoterol [facility]) 200-5 MCG/ACT inhaler Inhale 2 puffs into the lungs every 12 hours 4/27/15  Yes Duwaine Rona., DO   albuterol (PROVENTIL HFA) 108 (90 BASE) MCG/ACT inhaler Inhale 2 puffs into the lungs every 6 hours as needed for Wheezing 4/27/15  Yes Duwaine Rumple., DO   aspirin (ECOTRIN LOW STRENGTH) 81 MG EC tablet Take 1 tablet by mouth daily 12/28/16   Neeru Davis MD       ALLERGIES:  Patient has no known allergies.     SOCIAL Hx:  Social History     Socioeconomic History    Marital status: Single     Spouse name: Not on file    Number of children: Not on file    Years of education: Not on file    Highest education level: Not on file   Occupational History    Not on file   Social Needs    Financial resource strain: Not on file    Food insecurity     Worry: Not on file     Inability: Not on file    Transportation needs     Medical: Not on file     Non-medical: Not on file   Tobacco Use    Smoking status: Never Smoker    Smokeless tobacco: Never Used   Substance and Sexual Activity    Alcohol use: No    Drug use: No    Sexual activity: Yes     Partners: Female   Lifestyle    Physical activity     Days per week: Not on file     Minutes per session: Not on file    Stress: Not on file   Relationships    Social connections     Talks on phone: Not on file     Gets together: Not on file     Attends [employer]ism service: Not on file     Active member of club or organization: Not on file     Attends meetings of clubs or organizations: Not on file     Relationship status: Not on file    Intimate partner violence     Fear of current or ex partner: Not on file     Emotionally abused: Not on file     Physically abused: Not on file     Forced sexual activity: Not on file   Other Topics Concern    Not on file   Social History Narrative    Not on file       ROS: Positive in bold  General:   Denies chills, fatigue, fever, malaise, night sweats or weight loss    Psychological:   Denies anxiety, disorientation or hallucinations    ENT:    Denies epistaxis, headaches, vertigo or visual changes    Cardiovascular:   Denies any chest pain, irregular heartbeats, or palpitations. No paroxysmal nocturnal dyspnea. Respiratory:   Denies shortness of breath, coughing, sputum production, hemoptysis, or wheezing. No orthopnea. Gastrointestinal:   Denies nausea, vomiting, diarrhea, or constipation. Denies any abdominal pain. Denies change in bowel habits or stools. Genito-Urinary:    Denies any urgency, frequency, hematuria. Voiding without difficulty. Musculoskeletal:   Denies joint pain, joint stiffness, joint swelling or muscle pain    Neurology:    Denies any headache or focal neurological deficits. No weakness or paresthesia. Derm:    Denies any rashes, ulcers, or excoriations. Denies bruising. Extremities:   Denies any lower extremity swelling or edema. PHYSICAL EXAM:  VITALS:  Vitals:    01/24/21 0605   BP: (!) 188/98   Pulse: 82   Resp: 18   Temp: 98 °F (36.7 °C)   SpO2: 98%         CONSTITUTIONAL:    Awake, alert, cooperative, no apparent distress, and appears stated age    EYES:    PERRL, EOMI, sclera clear, conjunctiva normal    ENT:    Normocephalic, atraumatic, sinuses nontender on palpation. External ears without lesions. Oral pharynx with moist mucus membranes. Tonsils without erythema or exudates.     NECK:    Supple, symmetrical, trachea midline, no adenopathy, thyroid symmetric, not enlarged and no tenderness, skin normal, no bruits, no JVD    HEMATOLOGIC/LYMPHATICS:    No cervical lymphadenopathy and no supraclavicular lymphadenopathy    LUNGS:    Symmetric. No increased work of breathing, good air exchange, clear to auscultation bilaterally, no wheezes, rhonchi, or rales,     CARDIOVASCULAR:    Normal apical impulse, regular rate and rhythm, normal S1 and S2, no S3 or S4, and no murmur noted    ABDOMEN:    + Obese. No scars, normal bowel sounds, soft, non-distended, non-tender, no masses palpated, no hepatosplenomegaly, no rebound or guarding elicited on palpation. Patient does have suprapubic tenderness. MUSCULOSKELETAL:    There is no redness, warmth, or swelling of the joints. Full range of motion noted. Motor strength is 5 out of 5 all extremities bilaterally. Tone is normal.    NEUROLOGIC:    Awake, alert, oriented to name, place and time. Cranial nerves II-XII are grossly intact. Motor is 5 out of 5 bilaterally. SKIN:    Marked erythema and induration noted in suprapubic area right greater than left    EXTREMITIES:    Peripheral pulses present. No edema, cyanosis, or swelling.     LABORATORY DATA:  CBC with Differential:    Lab Results   Component Value Date    WBC 11.8 01/24/2021    RBC 4.41 01/24/2021    HGB 13.6 01/24/2021    HCT 41.5 01/24/2021     01/24/2021    MCV 94.1 01/24/2021    MCH 30.8 01/24/2021    MCHC 32.8 01/24/2021    RDW 12.9 01/24/2021    LYMPHOPCT 24.5 01/24/2021    MONOPCT 7.3 01/24/2021    BASOPCT 0.3 01/24/2021    MONOSABS 0.86 01/24/2021    LYMPHSABS 2.90 01/24/2021    EOSABS 0.25 01/24/2021    BASOSABS 0.04 01/24/2021     CMP:    Lab Results   Component Value Date     01/24/2021    K 3.7 01/24/2021    K 4.0 01/23/2021     01/24/2021    CO2 24 01/24/2021    BUN 8 01/24/2021    CREATININE 1.0 01/24/2021    GFRAA >60 01/24/2021    LABGLOM >60 01/24/2021    GLUCOSE 204 01/24/2021    PROT 7.6 01/24/2021    LABALBU 3.9 01/24/2021    CALCIUM 8.7 01/24/2021    BILITOT 0.4

## 2025-07-21 ENCOUNTER — HOSPITAL ENCOUNTER (EMERGENCY)
Age: 60
Discharge: HOME | End: 2025-07-21
Attending: FAMILY MEDICINE
Payer: COMMERCIAL

## 2025-07-21 VITALS
HEART RATE: 96 BPM | TEMPERATURE: 98.9 F | DIASTOLIC BLOOD PRESSURE: 90 MMHG | SYSTOLIC BLOOD PRESSURE: 158 MMHG | OXYGEN SATURATION: 99 % | RESPIRATION RATE: 16 BRPM

## 2025-07-21 DIAGNOSIS — M25.422 ELBOW SWELLING, LEFT: Primary | ICD-10-CM

## 2025-07-21 PROCEDURE — 6360000002 HC RX W HCPCS: Performed by: FAMILY MEDICINE

## 2025-07-21 PROCEDURE — 99284 EMERGENCY DEPT VISIT MOD MDM: CPT

## 2025-07-21 PROCEDURE — 96372 THER/PROPH/DIAG INJ SC/IM: CPT

## 2025-07-21 RX ORDER — CYCLOBENZAPRINE HCL 5 MG
5 TABLET ORAL 3 TIMES DAILY PRN
Qty: 30 TABLET | Refills: 0 | Status: SHIPPED | OUTPATIENT
Start: 2025-07-21 | End: 2025-07-31

## 2025-07-21 RX ORDER — DEXAMETHASONE SODIUM PHOSPHATE 10 MG/ML
10 INJECTION, SOLUTION INTRAMUSCULAR; INTRAVENOUS ONCE
Status: COMPLETED | OUTPATIENT
Start: 2025-07-21 | End: 2025-07-21

## 2025-07-21 RX ADMIN — DEXAMETHASONE SODIUM PHOSPHATE 10 MG: 10 INJECTION, SOLUTION INTRAMUSCULAR; INTRAVENOUS at 20:14

## 2025-07-21 ASSESSMENT — PAIN DESCRIPTION - PAIN TYPE: TYPE: ACUTE PAIN

## 2025-07-21 ASSESSMENT — PAIN DESCRIPTION - ORIENTATION: ORIENTATION: LEFT

## 2025-07-21 ASSESSMENT — PAIN SCALES - GENERAL: PAINLEVEL_OUTOF10: 6

## 2025-07-21 ASSESSMENT — PAIN - FUNCTIONAL ASSESSMENT
PAIN_FUNCTIONAL_ASSESSMENT: 0-10
PAIN_FUNCTIONAL_ASSESSMENT: ACTIVITIES ARE NOT PREVENTED

## 2025-07-21 ASSESSMENT — PAIN DESCRIPTION - DESCRIPTORS: DESCRIPTORS: ACHING

## 2025-07-21 ASSESSMENT — PAIN DESCRIPTION - FREQUENCY: FREQUENCY: CONTINUOUS

## 2025-07-21 ASSESSMENT — PAIN DESCRIPTION - LOCATION: LOCATION: ELBOW

## 2025-07-22 NOTE — ED PROVIDER NOTES
(PROVENTIL HFA) 108 (90 BASE) MCG/ACT INHALER    Inhale 2 puffs into the lungs every 6 hours as needed for Wheezing    HYDROCHLOROTHIAZIDE (MICROZIDE) 12.5 MG CAPSULE    Take 1 capsule by mouth every morning    HYDROXYZINE HCL (ATARAX) 50 MG TABLET        LISINOPRIL (PRINIVIL;ZESTRIL) 20 MG TABLET    Take 1 tablet by mouth daily    LOSARTAN (COZAAR) 100 MG TABLET    TAKE 1 TABLET BY MOUTH EVERY DAY    METFORMIN (GLUCOPHAGE) 1000 MG TABLET    Take 1 tablet by mouth 2 times daily (with meals)    METOPROLOL SUCCINATE (TOPROL XL) 50 MG EXTENDED RELEASE TABLET    Take 1 tablet by mouth daily    MOXIFLOXACIN (VIGAMOX) 0.5 % OPHTHALMIC SOLUTION    Use 2 drops to affected eye TID for 5 days    SITAGLIPTIN (JANUVIA) 100 MG TABLET    Take 1 tablet by mouth daily    TIZANIDINE (ZANAFLEX) 4 MG TABLET        TRAZODONE (DESYREL) 100 MG TABLET        VITAMIN D (ERGOCALCIFEROL) 1.25 MG (55906 UT) CAPS CAPSULE         No Known Allergies     Physical Exam       ED Triage Vitals   BP Systolic BP Percentile Diastolic BP Percentile Temp Temp Source Pulse Respirations SpO2   07/21/25 1951 -- -- 07/21/25 1949 07/21/25 1949 07/21/25 1949 07/21/25 1949 07/21/25 1949   (!) 158/90   98.9 °F (37.2 °C) Infrared 96 16 99 %      Height Weight         -- --                     Physical Exam   GENERAL APPEARANCE: Awake and alert. Cooperative. No acute distress.   HEAD: Normocephalic. Atraumatic.   EYES: Sclera anicteric.   ENT: Tolerates saliva. No trismus.   NECK: Supple. Trachea midline.   CARDIO: RRR. Radial pulse 2+.   LUNGS: Respirations unlabored. CTAB.  ABDOMEN: Soft. Non-distended. Non-tender.    EXTREMITIES: No acute deformities. Arms and legs are well-perfused and neurovascular intact with no pretibial edema or calf pain.   SKIN: Warm and dry.   NEUROLOGICAL: No gross facial drooping. Moves all 4 extremities spontaneously. Cn 2-12 intact, muscle strength and sensation 5/5 upper and lower extremities.   PSYCHIATRIC: Normal mood.